# Patient Record
Sex: MALE | Race: WHITE | NOT HISPANIC OR LATINO
[De-identification: names, ages, dates, MRNs, and addresses within clinical notes are randomized per-mention and may not be internally consistent; named-entity substitution may affect disease eponyms.]

---

## 2017-01-05 ENCOUNTER — APPOINTMENT (OUTPATIENT)
Dept: INTERNAL MEDICINE | Facility: CLINIC | Age: 82
End: 2017-01-05

## 2017-01-05 ENCOUNTER — NON-APPOINTMENT (OUTPATIENT)
Age: 82
End: 2017-01-05

## 2017-01-05 VITALS
DIASTOLIC BLOOD PRESSURE: 66 MMHG | WEIGHT: 206 LBS | BODY MASS INDEX: 33.11 KG/M2 | SYSTOLIC BLOOD PRESSURE: 126 MMHG | HEIGHT: 66 IN | TEMPERATURE: 97.7 F

## 2017-01-05 DIAGNOSIS — R31.0 GROSS HEMATURIA: ICD-10-CM

## 2017-01-09 ENCOUNTER — RX RENEWAL (OUTPATIENT)
Age: 82
End: 2017-01-09

## 2017-01-09 DIAGNOSIS — D52.9 FOLATE DEFICIENCY ANEMIA, UNSPECIFIED: ICD-10-CM

## 2017-01-20 ENCOUNTER — MESSAGE (OUTPATIENT)
Age: 82
End: 2017-01-20

## 2017-01-25 ENCOUNTER — RX RENEWAL (OUTPATIENT)
Age: 82
End: 2017-01-25

## 2017-02-14 ENCOUNTER — MESSAGE (OUTPATIENT)
Age: 82
End: 2017-02-14

## 2017-02-21 ENCOUNTER — MEDICATION RENEWAL (OUTPATIENT)
Age: 82
End: 2017-02-21

## 2017-02-22 ENCOUNTER — APPOINTMENT (OUTPATIENT)
Dept: INTERNAL MEDICINE | Facility: CLINIC | Age: 82
End: 2017-02-22

## 2017-02-22 ENCOUNTER — RESULT CHARGE (OUTPATIENT)
Age: 82
End: 2017-02-22

## 2017-02-22 VITALS
WEIGHT: 203 LBS | DIASTOLIC BLOOD PRESSURE: 60 MMHG | BODY MASS INDEX: 32.77 KG/M2 | SYSTOLIC BLOOD PRESSURE: 140 MMHG | TEMPERATURE: 98.3 F

## 2017-02-23 ENCOUNTER — LABORATORY RESULT (OUTPATIENT)
Age: 82
End: 2017-02-23

## 2017-02-23 VITALS — DIASTOLIC BLOOD PRESSURE: 56 MMHG | SYSTOLIC BLOOD PRESSURE: 112 MMHG

## 2017-02-24 ENCOUNTER — MESSAGE (OUTPATIENT)
Age: 82
End: 2017-02-24

## 2017-03-05 LAB
ALBUMIN SERPL ELPH-MCNC: 4.1 G/DL
ALP BLD-CCNC: 42 U/L
ALT SERPL-CCNC: 22 U/L
ANION GAP SERPL CALC-SCNC: 14 MMOL/L
APPEARANCE: ABNORMAL
AST SERPL-CCNC: 21 U/L
BASOPHILS # BLD AUTO: 0.08 K/UL
BASOPHILS NFR BLD AUTO: 1.4 %
BILIRUB SERPL-MCNC: 0.2 MG/DL
BILIRUB UR QL STRIP: ABNORMAL
BILIRUBIN URINE: NEGATIVE
BLOOD URINE: ABNORMAL
BUN SERPL-MCNC: 23 MG/DL
CALCIUM SERPL-MCNC: 9.5 MG/DL
CHLORIDE SERPL-SCNC: 102 MMOL/L
CO2 SERPL-SCNC: 25 MMOL/L
COLOR: ABNORMAL
CREAT SERPL-MCNC: 1.25 MG/DL
EOSINOPHIL # BLD AUTO: 0.14 K/UL
EOSINOPHIL NFR BLD AUTO: 2.5 %
GLUCOSE QUALITATIVE U: NORMAL MG/DL
GLUCOSE SERPL-MCNC: 87 MG/DL
GLUCOSE UR-MCNC: NEGATIVE
HCG UR QL: 0.2 EU/DL
HCT VFR BLD CALC: 33.8 %
HGB BLD-MCNC: 10.8 G/DL
HGB UR QL STRIP.AUTO: ABNORMAL
IMM GRANULOCYTES NFR BLD AUTO: 0.4 %
KETONES UR-MCNC: ABNORMAL
KETONES URINE: ABNORMAL
LEUKOCYTE ESTERASE UR QL STRIP: ABNORMAL
LEUKOCYTE ESTERASE URINE: ABNORMAL
LYMPHOCYTES # BLD AUTO: 1.34 K/UL
LYMPHOCYTES NFR BLD AUTO: 23.9 %
MAN DIFF?: NORMAL
MCHC RBC-ENTMCNC: 26.6 PG
MCHC RBC-ENTMCNC: 32 GM/DL
MCV RBC AUTO: 83.3 FL
MONOCYTES # BLD AUTO: 0.41 K/UL
MONOCYTES NFR BLD AUTO: 7.3 %
NEUTROPHILS # BLD AUTO: 3.62 K/UL
NEUTROPHILS NFR BLD AUTO: 64.5 %
NITRITE UR QL STRIP: NEGATIVE
NITRITE URINE: NEGATIVE
PH UR STRIP: 5.5
PH URINE: 5.5
PLATELET # BLD AUTO: 275 K/UL
POTASSIUM SERPL-SCNC: 4.1 MMOL/L
PROT SERPL-MCNC: 6.7 G/DL
PROT UR STRIP-MCNC: ABNORMAL
PROTEIN URINE: 300 MG/DL
RBC # BLD: 4.06 M/UL
RBC # FLD: 15.5 %
SODIUM SERPL-SCNC: 141 MMOL/L
SP GR UR STRIP: 1.03
SPECIFIC GRAVITY URINE: 1.03
UROBILINOGEN URINE: NORMAL MG/DL
WBC # FLD AUTO: 5.61 K/UL

## 2017-03-06 ENCOUNTER — MESSAGE (OUTPATIENT)
Age: 82
End: 2017-03-06

## 2017-03-13 ENCOUNTER — MESSAGE (OUTPATIENT)
Age: 82
End: 2017-03-13

## 2017-03-17 ENCOUNTER — APPOINTMENT (OUTPATIENT)
Dept: INTERNAL MEDICINE | Facility: CLINIC | Age: 82
End: 2017-03-17

## 2017-03-17 VITALS — SYSTOLIC BLOOD PRESSURE: 122 MMHG | DIASTOLIC BLOOD PRESSURE: 72 MMHG

## 2017-03-17 VITALS
TEMPERATURE: 97.8 F | BODY MASS INDEX: 33.57 KG/M2 | SYSTOLIC BLOOD PRESSURE: 140 MMHG | WEIGHT: 208 LBS | DIASTOLIC BLOOD PRESSURE: 60 MMHG

## 2017-03-17 DIAGNOSIS — R31.9 HEMATURIA, UNSPECIFIED: ICD-10-CM

## 2017-03-17 LAB
INR PPP: 1 RATIO
POCT-PROTHROMBIN TIME: 11.5 SECS
QUALITY CONTROL: YES

## 2017-03-19 LAB
BASOPHILS # BLD AUTO: 0.05 K/UL
BASOPHILS NFR BLD AUTO: 0.8 %
EOSINOPHIL # BLD AUTO: 0.13 K/UL
EOSINOPHIL NFR BLD AUTO: 2.2 %
HCT VFR BLD CALC: 35.4 %
HGB BLD-MCNC: 11.2 G/DL
IMM GRANULOCYTES NFR BLD AUTO: 0.5 %
LYMPHOCYTES # BLD AUTO: 1.56 K/UL
LYMPHOCYTES NFR BLD AUTO: 25.8 %
MAN DIFF?: NORMAL
MCHC RBC-ENTMCNC: 25.6 PG
MCHC RBC-ENTMCNC: 31.6 GM/DL
MCV RBC AUTO: 80.8 FL
MONOCYTES # BLD AUTO: 0.58 K/UL
MONOCYTES NFR BLD AUTO: 9.6 %
NEUTROPHILS # BLD AUTO: 3.69 K/UL
NEUTROPHILS NFR BLD AUTO: 61.1 %
PLATELET # BLD AUTO: 252 K/UL
RBC # BLD: 4.38 M/UL
RBC # FLD: 15.6 %
WBC # FLD AUTO: 6.04 K/UL

## 2017-03-24 ENCOUNTER — APPOINTMENT (OUTPATIENT)
Dept: INTERNAL MEDICINE | Facility: CLINIC | Age: 82
End: 2017-03-24

## 2017-03-24 VITALS
DIASTOLIC BLOOD PRESSURE: 66 MMHG | WEIGHT: 208 LBS | BODY MASS INDEX: 33.43 KG/M2 | HEIGHT: 66 IN | SYSTOLIC BLOOD PRESSURE: 120 MMHG

## 2017-04-12 ENCOUNTER — RX RENEWAL (OUTPATIENT)
Age: 82
End: 2017-04-12

## 2017-04-21 ENCOUNTER — RX RENEWAL (OUTPATIENT)
Age: 82
End: 2017-04-21

## 2017-04-24 ENCOUNTER — RX RENEWAL (OUTPATIENT)
Age: 82
End: 2017-04-24

## 2017-05-26 ENCOUNTER — MEDICATION RENEWAL (OUTPATIENT)
Age: 82
End: 2017-05-26

## 2017-06-23 ENCOUNTER — MEDICATION RENEWAL (OUTPATIENT)
Age: 82
End: 2017-06-23

## 2017-06-23 ENCOUNTER — APPOINTMENT (OUTPATIENT)
Dept: INTERNAL MEDICINE | Facility: CLINIC | Age: 82
End: 2017-06-23

## 2017-06-23 ENCOUNTER — LABORATORY RESULT (OUTPATIENT)
Age: 82
End: 2017-06-23

## 2017-06-23 VITALS
BODY MASS INDEX: 35.36 KG/M2 | HEIGHT: 66 IN | SYSTOLIC BLOOD PRESSURE: 124 MMHG | WEIGHT: 220 LBS | DIASTOLIC BLOOD PRESSURE: 78 MMHG

## 2017-06-23 DIAGNOSIS — Z00.00 ENCOUNTER FOR GENERAL ADULT MEDICAL EXAMINATION W/OUT ABNORMAL FINDINGS: ICD-10-CM

## 2017-06-23 LAB
ALBUMIN SERPL ELPH-MCNC: 4.1 G/DL
ALP BLD-CCNC: 50 U/L
ALT SERPL-CCNC: 33 U/L
ANION GAP SERPL CALC-SCNC: 22 MMOL/L
AST SERPL-CCNC: 27 U/L
BASOPHILS # BLD AUTO: 0.06 K/UL
BASOPHILS NFR BLD AUTO: 0.9 %
BILIRUB SERPL-MCNC: <0.2 MG/DL
BUN SERPL-MCNC: 26 MG/DL
CALCIUM SERPL-MCNC: 10.6 MG/DL
CHLORIDE SERPL-SCNC: 99 MMOL/L
CHOLEST SERPL-MCNC: 158 MG/DL
CHOLEST/HDLC SERPL: 4.2 RATIO
CO2 SERPL-SCNC: 19 MMOL/L
CREAT SERPL-MCNC: 1.4 MG/DL
EOSINOPHIL # BLD AUTO: 0.2 K/UL
EOSINOPHIL NFR BLD AUTO: 3 %
GLUCOSE SERPL-MCNC: 245 MG/DL
HBA1C MFR BLD HPLC: 7.8 %
HCT VFR BLD CALC: 39.1 %
HDLC SERPL-MCNC: 38 MG/DL
HGB BLD-MCNC: 12.8 G/DL
IMM GRANULOCYTES NFR BLD AUTO: 0.3 %
LDLC SERPL CALC-MCNC: 53 MG/DL
LYMPHOCYTES # BLD AUTO: 1.62 K/UL
LYMPHOCYTES NFR BLD AUTO: 24.6 %
MAN DIFF?: NORMAL
MCHC RBC-ENTMCNC: 26.5 PG
MCHC RBC-ENTMCNC: 32.7 GM/DL
MCV RBC AUTO: 81 FL
MONOCYTES # BLD AUTO: 0.48 K/UL
MONOCYTES NFR BLD AUTO: 7.3 %
NEUTROPHILS # BLD AUTO: 4.2 K/UL
NEUTROPHILS NFR BLD AUTO: 63.9 %
PLATELET # BLD AUTO: 260 K/UL
POTASSIUM SERPL-SCNC: 4.3 MMOL/L
PROT SERPL-MCNC: 7.6 G/DL
RBC # BLD: 4.83 M/UL
RBC # FLD: 17.5 %
SODIUM SERPL-SCNC: 140 MMOL/L
T3 SERPL-MCNC: 112 NG/DL
T4 SERPL-MCNC: 7.3 UG/DL
TRIGL SERPL-MCNC: 335 MG/DL
TSH SERPL-ACNC: 4.41 UIU/ML
WBC # FLD AUTO: 6.58 K/UL

## 2017-07-09 ENCOUNTER — RX RENEWAL (OUTPATIENT)
Age: 82
End: 2017-07-09

## 2017-07-13 ENCOUNTER — MEDICATION RENEWAL (OUTPATIENT)
Age: 82
End: 2017-07-13

## 2017-07-27 ENCOUNTER — APPOINTMENT (OUTPATIENT)
Dept: INTERNAL MEDICINE | Facility: CLINIC | Age: 82
End: 2017-07-27
Payer: MEDICARE

## 2017-07-27 VITALS
SYSTOLIC BLOOD PRESSURE: 130 MMHG | BODY MASS INDEX: 33.59 KG/M2 | WEIGHT: 209 LBS | HEIGHT: 66 IN | TEMPERATURE: 97.8 F | DIASTOLIC BLOOD PRESSURE: 70 MMHG

## 2017-07-27 PROCEDURE — 99214 OFFICE O/P EST MOD 30 MIN: CPT

## 2017-07-28 ENCOUNTER — RX RENEWAL (OUTPATIENT)
Age: 82
End: 2017-07-28

## 2017-07-28 ENCOUNTER — LABORATORY RESULT (OUTPATIENT)
Age: 82
End: 2017-07-28

## 2017-08-02 ENCOUNTER — APPOINTMENT (OUTPATIENT)
Dept: GASTROENTEROLOGY | Facility: CLINIC | Age: 82
End: 2017-08-02

## 2017-08-03 ENCOUNTER — MESSAGE (OUTPATIENT)
Age: 82
End: 2017-08-03

## 2017-08-03 ENCOUNTER — MEDICATION RENEWAL (OUTPATIENT)
Age: 82
End: 2017-08-03

## 2017-08-03 LAB
BACTERIA STL CULT: NORMAL
C DIFF TOX GENS STL QL NAA+PROBE: NORMAL
CDIFF BY PCR: NOT DETECTED
CRYPTOSP AG SPEC QL: NORMAL
G LAMBLIA AG STL QL: NORMAL

## 2017-08-04 LAB — DEPRECATED O AND P PREP STL: NORMAL

## 2017-09-06 ENCOUNTER — APPOINTMENT (OUTPATIENT)
Dept: INTERNAL MEDICINE | Facility: CLINIC | Age: 82
End: 2017-09-06
Payer: MEDICARE

## 2017-09-06 ENCOUNTER — RESULT CHARGE (OUTPATIENT)
Age: 82
End: 2017-09-06

## 2017-09-06 DIAGNOSIS — R94.6 ABNORMAL RESULTS OF THYROID FUNCTION STUDIES: ICD-10-CM

## 2017-09-06 PROCEDURE — 90686 IIV4 VACC NO PRSV 0.5 ML IM: CPT

## 2017-09-06 PROCEDURE — G0008: CPT

## 2017-09-06 PROCEDURE — 99214 OFFICE O/P EST MOD 30 MIN: CPT | Mod: 25

## 2017-09-06 PROCEDURE — 93040 RHYTHM ECG WITH REPORT: CPT | Mod: 59

## 2017-09-06 PROCEDURE — 93000 ELECTROCARDIOGRAM COMPLETE: CPT

## 2017-09-06 PROCEDURE — 36415 COLL VENOUS BLD VENIPUNCTURE: CPT

## 2017-09-06 RX ORDER — COLCHICINE 0.6 MG/1
0.6 TABLET, FILM COATED ORAL DAILY
Qty: 90 | Refills: 3 | Status: DISCONTINUED | COMMUNITY
Start: 2017-06-23 | End: 2017-09-06

## 2017-09-07 ENCOUNTER — LABORATORY RESULT (OUTPATIENT)
Age: 82
End: 2017-09-07

## 2017-09-07 VITALS — WEIGHT: 218 LBS | BODY MASS INDEX: 35.19 KG/M2

## 2017-09-07 PROBLEM — R94.6 ELEVATED TSH: Status: ACTIVE | Noted: 2017-09-07

## 2017-09-12 ENCOUNTER — APPOINTMENT (OUTPATIENT)
Dept: INTERNAL MEDICINE | Facility: CLINIC | Age: 82
End: 2017-09-12
Payer: MEDICARE

## 2017-09-12 PROCEDURE — 93306 TTE W/DOPPLER COMPLETE: CPT

## 2017-10-15 LAB
ALBUMIN SERPL ELPH-MCNC: 4 G/DL
ALP BLD-CCNC: 38 U/L
ALT SERPL-CCNC: 33 U/L
ANION GAP SERPL CALC-SCNC: 18 MMOL/L
AST SERPL-CCNC: 32 U/L
BASOPHILS # BLD AUTO: 0.03 K/UL
BASOPHILS NFR BLD AUTO: 0.9 %
BILIRUB SERPL-MCNC: 0.3 MG/DL
BUN SERPL-MCNC: 26 MG/DL
CALCIUM SERPL-MCNC: 10 MG/DL
CHLORIDE SERPL-SCNC: 99 MMOL/L
CHOLEST SERPL-MCNC: 144 MG/DL
CHOLEST/HDLC SERPL: 3.8 RATIO
CO2 SERPL-SCNC: 23 MMOL/L
CREAT SERPL-MCNC: 1.29 MG/DL
EOSINOPHIL # BLD AUTO: 0.12 K/UL
EOSINOPHIL NFR BLD AUTO: 3.6 %
GLUCOSE SERPL-MCNC: 129 MG/DL
HCT VFR BLD CALC: 39.4 %
HDLC SERPL-MCNC: 38 MG/DL
HGB BLD-MCNC: 12.9 G/DL
IMM GRANULOCYTES NFR BLD AUTO: 1.2 %
LDLC SERPL CALC-MCNC: NORMAL
LDLC SERPL DIRECT ASSAY-MCNC: 56 MG/DL
LYMPHOCYTES # BLD AUTO: 1.1 K/UL
LYMPHOCYTES NFR BLD AUTO: 32.7 %
MAN DIFF?: NORMAL
MCHC RBC-ENTMCNC: 28.3 PG
MCHC RBC-ENTMCNC: 32.7 GM/DL
MCV RBC AUTO: 86.4 FL
MONOCYTES # BLD AUTO: 0.27 K/UL
MONOCYTES NFR BLD AUTO: 8 %
NEUTROPHILS # BLD AUTO: 1.8 K/UL
NEUTROPHILS NFR BLD AUTO: 53.6 %
NT-PROBNP SERPL-MCNC: 114 PG/ML
PLATELET # BLD AUTO: 227 K/UL
POTASSIUM SERPL-SCNC: 3.9 MMOL/L
PROT SERPL-MCNC: 7.4 G/DL
RBC # BLD: 4.56 M/UL
RBC # FLD: 16.3 %
SODIUM SERPL-SCNC: 140 MMOL/L
T3 SERPL-MCNC: 115 NG/DL
T3RU NFR SERPL: 1.08 INDEX
T4 FREE SERPL-MCNC: 1 NG/DL
T4 SERPL-MCNC: 6.7 UG/DL
TRIGL SERPL-MCNC: 536 MG/DL
TSH SERPL-ACNC: 5.26 UIU/ML
WBC # FLD AUTO: 3.36 K/UL

## 2017-10-20 ENCOUNTER — APPOINTMENT (OUTPATIENT)
Dept: INTERNAL MEDICINE | Facility: CLINIC | Age: 82
End: 2017-10-20
Payer: MEDICARE

## 2017-10-20 VITALS
SYSTOLIC BLOOD PRESSURE: 124 MMHG | HEIGHT: 66 IN | DIASTOLIC BLOOD PRESSURE: 88 MMHG | WEIGHT: 211 LBS | BODY MASS INDEX: 33.91 KG/M2

## 2017-10-20 PROCEDURE — 99215 OFFICE O/P EST HI 40 MIN: CPT | Mod: 25

## 2017-10-20 PROCEDURE — 94010 BREATHING CAPACITY TEST: CPT

## 2017-10-24 ENCOUNTER — RX RENEWAL (OUTPATIENT)
Age: 82
End: 2017-10-24

## 2017-10-25 ENCOUNTER — RX RENEWAL (OUTPATIENT)
Age: 82
End: 2017-10-25

## 2017-10-27 ENCOUNTER — RX RENEWAL (OUTPATIENT)
Age: 82
End: 2017-10-27

## 2017-11-09 ENCOUNTER — RX RENEWAL (OUTPATIENT)
Age: 82
End: 2017-11-09

## 2017-11-27 ENCOUNTER — EMERGENCY (EMERGENCY)
Facility: HOSPITAL | Age: 82
LOS: 1 days | Discharge: HOME | End: 2017-11-27
Attending: EMERGENCY MEDICINE | Admitting: EMERGENCY MEDICINE
Payer: MEDICARE

## 2017-11-27 VITALS
HEART RATE: 78 BPM | TEMPERATURE: 99 F | SYSTOLIC BLOOD PRESSURE: 185 MMHG | DIASTOLIC BLOOD PRESSURE: 70 MMHG | RESPIRATION RATE: 16 BRPM | OXYGEN SATURATION: 96 %

## 2017-11-27 VITALS
DIASTOLIC BLOOD PRESSURE: 85 MMHG | OXYGEN SATURATION: 94 % | TEMPERATURE: 98 F | HEART RATE: 70 BPM | SYSTOLIC BLOOD PRESSURE: 169 MMHG | RESPIRATION RATE: 16 BRPM

## 2017-11-27 DIAGNOSIS — C61 MALIGNANT NEOPLASM OF PROSTATE: Chronic | ICD-10-CM

## 2017-11-27 LAB
ALBUMIN SERPL ELPH-MCNC: 3.9 G/DL — SIGNIFICANT CHANGE UP (ref 3.3–5)
ALP SERPL-CCNC: 45 U/L — SIGNIFICANT CHANGE UP (ref 40–120)
ALT FLD-CCNC: 25 U/L RC — SIGNIFICANT CHANGE UP (ref 10–45)
ANION GAP SERPL CALC-SCNC: 13 MMOL/L — SIGNIFICANT CHANGE UP (ref 5–17)
APPEARANCE UR: CLEAR — SIGNIFICANT CHANGE UP
APTT BLD: 29.5 SEC — SIGNIFICANT CHANGE UP (ref 27.5–37.4)
AST SERPL-CCNC: 22 U/L — SIGNIFICANT CHANGE UP (ref 10–40)
BASOPHILS # BLD AUTO: 0.1 K/UL — SIGNIFICANT CHANGE UP (ref 0–0.2)
BASOPHILS NFR BLD AUTO: 0.7 % — SIGNIFICANT CHANGE UP (ref 0–2)
BILIRUB SERPL-MCNC: 0.4 MG/DL — SIGNIFICANT CHANGE UP (ref 0.2–1.2)
BILIRUB UR-MCNC: NEGATIVE — SIGNIFICANT CHANGE UP
BUN SERPL-MCNC: 22 MG/DL — SIGNIFICANT CHANGE UP (ref 7–23)
CALCIUM SERPL-MCNC: 10 MG/DL — SIGNIFICANT CHANGE UP (ref 8.4–10.5)
CHLORIDE SERPL-SCNC: 98 MMOL/L — SIGNIFICANT CHANGE UP (ref 96–108)
CO2 SERPL-SCNC: 29 MMOL/L — SIGNIFICANT CHANGE UP (ref 22–31)
COLOR SPEC: YELLOW — SIGNIFICANT CHANGE UP
CREAT SERPL-MCNC: 1.18 MG/DL — SIGNIFICANT CHANGE UP (ref 0.5–1.3)
DIFF PNL FLD: ABNORMAL
EOSINOPHIL # BLD AUTO: 0.2 K/UL — SIGNIFICANT CHANGE UP (ref 0–0.5)
EOSINOPHIL NFR BLD AUTO: 2.5 % — SIGNIFICANT CHANGE UP (ref 0–6)
GLUCOSE SERPL-MCNC: 223 MG/DL — HIGH (ref 70–99)
GLUCOSE UR QL: 250
HCT VFR BLD CALC: 40.4 % — SIGNIFICANT CHANGE UP (ref 39–50)
HGB BLD-MCNC: 14.1 G/DL — SIGNIFICANT CHANGE UP (ref 13–17)
INR BLD: 1.14 RATIO — SIGNIFICANT CHANGE UP (ref 0.88–1.16)
KETONES UR-MCNC: NEGATIVE — SIGNIFICANT CHANGE UP
LEUKOCYTE ESTERASE UR-ACNC: NEGATIVE — SIGNIFICANT CHANGE UP
LYMPHOCYTES # BLD AUTO: 1.7 K/UL — SIGNIFICANT CHANGE UP (ref 1–3.3)
LYMPHOCYTES # BLD AUTO: 21.9 % — SIGNIFICANT CHANGE UP (ref 13–44)
MCHC RBC-ENTMCNC: 31.3 PG — SIGNIFICANT CHANGE UP (ref 27–34)
MCHC RBC-ENTMCNC: 34.8 GM/DL — SIGNIFICANT CHANGE UP (ref 32–36)
MCV RBC AUTO: 89.9 FL — SIGNIFICANT CHANGE UP (ref 80–100)
MONOCYTES # BLD AUTO: 0.7 K/UL — SIGNIFICANT CHANGE UP (ref 0–0.9)
MONOCYTES NFR BLD AUTO: 9.1 % — SIGNIFICANT CHANGE UP (ref 2–14)
NEUTROPHILS # BLD AUTO: 5.2 K/UL — SIGNIFICANT CHANGE UP (ref 1.8–7.4)
NEUTROPHILS NFR BLD AUTO: 65.8 % — SIGNIFICANT CHANGE UP (ref 43–77)
NITRITE UR-MCNC: NEGATIVE — SIGNIFICANT CHANGE UP
PH UR: 6.5 — SIGNIFICANT CHANGE UP (ref 5–8)
PLATELET # BLD AUTO: 254 K/UL — SIGNIFICANT CHANGE UP (ref 150–400)
POTASSIUM SERPL-MCNC: 3.7 MMOL/L — SIGNIFICANT CHANGE UP (ref 3.5–5.3)
POTASSIUM SERPL-SCNC: 3.7 MMOL/L — SIGNIFICANT CHANGE UP (ref 3.5–5.3)
PROT SERPL-MCNC: 7.3 G/DL — SIGNIFICANT CHANGE UP (ref 6–8.3)
PROT UR-MCNC: 500 MG/DL
PROTHROM AB SERPL-ACNC: 12.4 SEC — SIGNIFICANT CHANGE UP (ref 9.8–12.7)
RBC # BLD: 4.5 M/UL — SIGNIFICANT CHANGE UP (ref 4.2–5.8)
RBC # FLD: 13.7 % — SIGNIFICANT CHANGE UP (ref 10.3–14.5)
RBC CASTS # UR COMP ASSIST: SIGNIFICANT CHANGE UP /HPF (ref 0–2)
SODIUM SERPL-SCNC: 140 MMOL/L — SIGNIFICANT CHANGE UP (ref 135–145)
SP GR SPEC: 1.02 — SIGNIFICANT CHANGE UP (ref 1.01–1.02)
UROBILINOGEN FLD QL: NEGATIVE — SIGNIFICANT CHANGE UP
WBC # BLD: 7.8 K/UL — SIGNIFICANT CHANGE UP (ref 3.8–10.5)
WBC # FLD AUTO: 7.8 K/UL — SIGNIFICANT CHANGE UP (ref 3.8–10.5)
WBC UR QL: SIGNIFICANT CHANGE UP /HPF (ref 0–5)

## 2017-11-27 PROCEDURE — 99285 EMERGENCY DEPT VISIT HI MDM: CPT

## 2017-11-27 PROCEDURE — 71260 CT THORAX DX C+: CPT | Mod: 26

## 2017-11-27 PROCEDURE — 74177 CT ABD & PELVIS W/CONTRAST: CPT | Mod: 26

## 2017-11-27 RX ORDER — LIDOCAINE 4 G/100G
1 CREAM TOPICAL ONCE
Qty: 0 | Refills: 0 | Status: COMPLETED | OUTPATIENT
Start: 2017-11-27 | End: 2017-11-27

## 2017-11-27 RX ORDER — MORPHINE SULFATE 50 MG/1
4 CAPSULE, EXTENDED RELEASE ORAL ONCE
Qty: 0 | Refills: 0 | Status: DISCONTINUED | OUTPATIENT
Start: 2017-11-27 | End: 2017-11-27

## 2017-11-27 RX ORDER — ACETAMINOPHEN 500 MG
1000 TABLET ORAL ONCE
Qty: 0 | Refills: 0 | Status: COMPLETED | OUTPATIENT
Start: 2017-11-27 | End: 2017-11-27

## 2017-11-27 RX ORDER — TETANUS TOXOID, REDUCED DIPHTHERIA TOXOID AND ACELLULAR PERTUSSIS VACCINE, ADSORBED 5; 2.5; 8; 8; 2.5 [IU]/.5ML; [IU]/.5ML; UG/.5ML; UG/.5ML; UG/.5ML
0.5 SUSPENSION INTRAMUSCULAR ONCE
Qty: 0 | Refills: 0 | Status: COMPLETED | OUTPATIENT
Start: 2017-11-27 | End: 2017-11-27

## 2017-11-27 RX ADMIN — Medication 400 MILLIGRAM(S): at 18:00

## 2017-11-27 RX ADMIN — LIDOCAINE 1 PATCH: 4 CREAM TOPICAL at 20:48

## 2017-11-27 RX ADMIN — Medication 1000 MILLIGRAM(S): at 20:49

## 2017-11-27 RX ADMIN — MORPHINE SULFATE 4 MILLIGRAM(S): 50 CAPSULE, EXTENDED RELEASE ORAL at 19:28

## 2017-11-27 RX ADMIN — MORPHINE SULFATE 4 MILLIGRAM(S): 50 CAPSULE, EXTENDED RELEASE ORAL at 20:49

## 2017-11-27 NOTE — ED PROVIDER NOTE - PLAN OF CARE
1. Rest, ice, elevate area.  Take Tylenol 650mg every 4-6 hrs with food for pain. Continue all home medications as prescribed.   2. Follow up with PMD within 48-72 hrs.    3. Any worsening pain, swelling, weakness, numbness return to ED. Ortho referral list provided if needed.

## 2017-11-27 NOTE — ED PROVIDER NOTE - ATTENDING CONTRIBUTION TO CARE
Angelika Amezcua MD - Attending Physician: I have personally seen and examined this patient. I have discussed the case with the ACP. I have reviewed all pertinent clinical information, including history, physical exam, plan and the ACP’s note and agree except as noted. See MDM

## 2017-11-27 NOTE — ED PROVIDER NOTE - OBJECTIVE STATEMENT
81yom pmhx of HTN HLD CAD DM GERD hx of Afib on xarelto bib spouse s/p fall 2 nights ago with trauma to the R flank/lower rib region with the end table along with scarping of the skin. since then with worsening pain and pain with movement and inspiration. NO fever or chlls. No head trauma. No vomiting. No ams.

## 2017-11-27 NOTE — ED ADULT NURSE NOTE - PMH
Atrial flutter    CAD (coronary artery disease)    Diabetes mellitus  type 2  Diverticulosis    GERD (gastroesophageal reflux disease)    Hyperlipidemia    Hypertension    Irritable bowel syndrome    Left bundle branch block (LBBB)    Obesity    Osteoarthritis  knee  Prostate CA

## 2017-11-27 NOTE — ED ADULT TRIAGE NOTE - CHIEF COMPLAINT QUOTE
trip/fall 2 days ago, hit back on table then onto floor   denies hitting head, no loc  on xarelto for afib

## 2017-11-27 NOTE — ED PROVIDER NOTE - PROGRESS NOTE DETAILS
results of the CT scan and bloodwork discussed with pt. No acute fx. pt comfortable. ambulatory in the ER. stable vitals. Will follow up with Dr. Felipe Garcia in office. -SANGEETA Hernandez

## 2017-11-27 NOTE — ED PROVIDER NOTE - MEDICAL DECISION MAKING DETAILS
Angelika Amezcua MD - Attending Physician: Pt with mechanical fall, here with flank pain, on xarelto. Tender to palpation, superficial hematoma noted. Imaging for eval, Labs for hb

## 2017-11-27 NOTE — ED PROVIDER NOTE - CARE PLAN
Instructions for follow-up, activity and diet:	1. Rest, ice, elevate area.  Take Tylenol 650mg every 4-6 hrs with food for pain. Continue all home medications as prescribed.   2. Follow up with PMD within 48-72 hrs.    3. Any worsening pain, swelling, weakness, numbness return to ED. Ortho referral list provided if needed. Principal Discharge DX:	Flank pain  Instructions for follow-up, activity and diet:	1. Rest, ice, elevate area.  Take Tylenol 650mg every 4-6 hrs with food for pain. Continue all home medications as prescribed.   2. Follow up with PMD within 48-72 hrs.    3. Any worsening pain, swelling, weakness, numbness return to ED. Ortho referral list provided if needed.

## 2017-11-27 NOTE — ED PROVIDER NOTE - PHYSICAL EXAMINATION
large area of ecchymosis to the R flank region and tenderness over the lower rib region. no crepitus. limited exam due to pain and limited rom due to pain

## 2017-11-27 NOTE — ED ADULT NURSE NOTE - OBJECTIVE STATEMENT
pt with fall right flan pain pt fell 48hrs ago durring the night denies head injury ambulatory to er with wife denies dizziness pt with right flank abradsion bruising pt denies hematuria skin warm dry no vomiting mno visual changes deneis chest pain no further complaints accompanied to er with wife pt with labs sent and ct scans posted pt declined morphine at this time accepted tylenol iv

## 2017-11-29 RX ORDER — OXYCODONE HYDROCHLORIDE 5 MG/1
1 TABLET ORAL
Qty: 12 | Refills: 0 | OUTPATIENT
Start: 2017-11-29

## 2017-12-26 ENCOUNTER — RX RENEWAL (OUTPATIENT)
Age: 82
End: 2017-12-26

## 2017-12-28 ENCOUNTER — LABORATORY RESULT (OUTPATIENT)
Age: 82
End: 2017-12-28

## 2017-12-28 ENCOUNTER — APPOINTMENT (OUTPATIENT)
Dept: INTERNAL MEDICINE | Facility: CLINIC | Age: 82
End: 2017-12-28
Payer: MEDICARE

## 2017-12-28 VITALS
DIASTOLIC BLOOD PRESSURE: 72 MMHG | WEIGHT: 207 LBS | SYSTOLIC BLOOD PRESSURE: 120 MMHG | HEIGHT: 65 IN | BODY MASS INDEX: 34.49 KG/M2

## 2017-12-28 DIAGNOSIS — D64.9 ANEMIA, UNSPECIFIED: ICD-10-CM

## 2017-12-28 DIAGNOSIS — R26.89 OTHER ABNORMALITIES OF GAIT AND MOBILITY: ICD-10-CM

## 2017-12-28 PROCEDURE — 36415 COLL VENOUS BLD VENIPUNCTURE: CPT

## 2017-12-28 PROCEDURE — 99214 OFFICE O/P EST MOD 30 MIN: CPT | Mod: 25

## 2017-12-28 PROCEDURE — 94010 BREATHING CAPACITY TEST: CPT

## 2018-01-05 LAB
ALBUMIN SERPL ELPH-MCNC: 3.9 G/DL
ALP BLD-CCNC: 64 U/L
ALT SERPL-CCNC: 34 U/L
ANION GAP SERPL CALC-SCNC: 18 MMOL/L
AST SERPL-CCNC: 34 U/L
BASOPHILS # BLD AUTO: 0.05 K/UL
BASOPHILS NFR BLD AUTO: 0.7 %
BILIRUB SERPL-MCNC: 0.2 MG/DL
BUN SERPL-MCNC: 23 MG/DL
CALCIUM SERPL-MCNC: 10.4 MG/DL
CHLORIDE SERPL-SCNC: 99 MMOL/L
CHOLEST SERPL-MCNC: 131 MG/DL
CHOLEST/HDLC SERPL: 3.4 RATIO
CO2 SERPL-SCNC: 24 MMOL/L
CREAT SERPL-MCNC: 1.34 MG/DL
EOSINOPHIL # BLD AUTO: 0.22 K/UL
EOSINOPHIL NFR BLD AUTO: 3.2 %
GLUCOSE SERPL-MCNC: 195 MG/DL
HBA1C MFR BLD HPLC: 6.4 %
HCT VFR BLD CALC: 41.2 %
HDLC SERPL-MCNC: 39 MG/DL
HGB BLD-MCNC: 13.8 G/DL
IMM GRANULOCYTES NFR BLD AUTO: 0.3 %
LDLC SERPL CALC-MCNC: 34 MG/DL
LYMPHOCYTES # BLD AUTO: 1.39 K/UL
LYMPHOCYTES NFR BLD AUTO: 20.1 %
MAN DIFF?: NORMAL
MCHC RBC-ENTMCNC: 29.9 PG
MCHC RBC-ENTMCNC: 33.5 GM/DL
MCV RBC AUTO: 89.4 FL
MONOCYTES # BLD AUTO: 0.5 K/UL
MONOCYTES NFR BLD AUTO: 7.2 %
NEUTROPHILS # BLD AUTO: 4.75 K/UL
NEUTROPHILS NFR BLD AUTO: 68.5 %
PLATELET # BLD AUTO: 235 K/UL
POTASSIUM SERPL-SCNC: 4.3 MMOL/L
PROT SERPL-MCNC: 7.4 G/DL
RBC # BLD: 4.61 M/UL
RBC # FLD: 15.1 %
SODIUM SERPL-SCNC: 141 MMOL/L
T3 SERPL-MCNC: 130 NG/DL
T4 SERPL-MCNC: 7.1 UG/DL
TRIGL SERPL-MCNC: 291 MG/DL
TSH SERPL-ACNC: 5.13 UIU/ML
WBC # FLD AUTO: 6.93 K/UL

## 2018-01-08 ENCOUNTER — RX RENEWAL (OUTPATIENT)
Age: 83
End: 2018-01-08

## 2018-01-22 ENCOUNTER — RX RENEWAL (OUTPATIENT)
Age: 83
End: 2018-01-22

## 2018-01-23 ENCOUNTER — RX RENEWAL (OUTPATIENT)
Age: 83
End: 2018-01-23

## 2018-01-30 ENCOUNTER — APPOINTMENT (OUTPATIENT)
Dept: ORTHOPEDIC SURGERY | Facility: CLINIC | Age: 83
End: 2018-01-30
Payer: MEDICARE

## 2018-01-30 VITALS
WEIGHT: 206 LBS | SYSTOLIC BLOOD PRESSURE: 151 MMHG | BODY MASS INDEX: 33.11 KG/M2 | HEIGHT: 66 IN | DIASTOLIC BLOOD PRESSURE: 69 MMHG | HEART RATE: 80 BPM

## 2018-01-30 DIAGNOSIS — M25.562 PAIN IN LEFT KNEE: ICD-10-CM

## 2018-01-30 PROCEDURE — 99204 OFFICE O/P NEW MOD 45 MIN: CPT | Mod: 25

## 2018-01-30 PROCEDURE — 73564 X-RAY EXAM KNEE 4 OR MORE: CPT | Mod: LT

## 2018-01-30 PROCEDURE — 20610 DRAIN/INJ JOINT/BURSA W/O US: CPT | Mod: LT

## 2018-02-02 ENCOUNTER — RX RENEWAL (OUTPATIENT)
Age: 83
End: 2018-02-02

## 2018-02-22 ENCOUNTER — LABORATORY RESULT (OUTPATIENT)
Age: 83
End: 2018-02-22

## 2018-02-22 ENCOUNTER — APPOINTMENT (OUTPATIENT)
Dept: INTERNAL MEDICINE | Facility: CLINIC | Age: 83
End: 2018-02-22
Payer: MEDICARE

## 2018-02-22 VITALS
BODY MASS INDEX: 32.95 KG/M2 | WEIGHT: 205 LBS | HEIGHT: 66 IN | DIASTOLIC BLOOD PRESSURE: 70 MMHG | SYSTOLIC BLOOD PRESSURE: 118 MMHG | TEMPERATURE: 96.5 F

## 2018-02-22 DIAGNOSIS — H26.9 UNSPECIFIED CATARACT: ICD-10-CM

## 2018-02-22 DIAGNOSIS — F32.9 MAJOR DEPRESSIVE DISORDER, SINGLE EPISODE, UNSPECIFIED: ICD-10-CM

## 2018-02-22 DIAGNOSIS — Z01.818 ENCOUNTER FOR OTHER PREPROCEDURAL EXAMINATION: ICD-10-CM

## 2018-02-22 PROCEDURE — 99214 OFFICE O/P EST MOD 30 MIN: CPT | Mod: 25

## 2018-02-22 PROCEDURE — 36415 COLL VENOUS BLD VENIPUNCTURE: CPT

## 2018-02-22 PROCEDURE — 93000 ELECTROCARDIOGRAM COMPLETE: CPT

## 2018-02-26 ENCOUNTER — APPOINTMENT (OUTPATIENT)
Dept: CARDIOLOGY | Facility: CLINIC | Age: 83
End: 2018-02-26
Payer: MEDICARE

## 2018-02-26 ENCOUNTER — NON-APPOINTMENT (OUTPATIENT)
Age: 83
End: 2018-02-26

## 2018-02-26 VITALS — WEIGHT: 205 LBS | BODY MASS INDEX: 33.09 KG/M2 | OXYGEN SATURATION: 95 % | HEART RATE: 63 BPM

## 2018-02-26 VITALS — SYSTOLIC BLOOD PRESSURE: 140 MMHG | DIASTOLIC BLOOD PRESSURE: 60 MMHG

## 2018-02-26 DIAGNOSIS — R10.819 ABDOMINAL TENDERNESS, UNSPECIFIED SITE: ICD-10-CM

## 2018-02-26 DIAGNOSIS — I51.7 CARDIOMEGALY: ICD-10-CM

## 2018-02-26 DIAGNOSIS — I44.7 LEFT BUNDLE-BRANCH BLOCK, UNSPECIFIED: ICD-10-CM

## 2018-02-26 PROCEDURE — 93000 ELECTROCARDIOGRAM COMPLETE: CPT

## 2018-02-26 PROCEDURE — 93040 RHYTHM ECG WITH REPORT: CPT | Mod: 59

## 2018-02-26 PROCEDURE — 99215 OFFICE O/P EST HI 40 MIN: CPT

## 2018-02-27 ENCOUNTER — NON-APPOINTMENT (OUTPATIENT)
Age: 83
End: 2018-02-27

## 2018-02-27 LAB
ALBUMIN SERPL ELPH-MCNC: 3.9 G/DL
ALP BLD-CCNC: 45 U/L
ALT SERPL-CCNC: 29 U/L
ANION GAP SERPL CALC-SCNC: 18 MMOL/L
AST SERPL-CCNC: 28 U/L
BASOPHILS # BLD AUTO: 0.04 K/UL
BASOPHILS NFR BLD AUTO: 0.5 %
BILIRUB SERPL-MCNC: 0.4 MG/DL
BUN SERPL-MCNC: 24 MG/DL
CALCIUM SERPL-MCNC: 9.8 MG/DL
CHLORIDE SERPL-SCNC: 97 MMOL/L
CO2 SERPL-SCNC: 22 MMOL/L
CREAT SERPL-MCNC: 1.27 MG/DL
EOSINOPHIL # BLD AUTO: 0.21 K/UL
EOSINOPHIL NFR BLD AUTO: 2.8 %
GLUCOSE SERPL-MCNC: 164 MG/DL
HCT VFR BLD CALC: 40.2 %
HGB BLD-MCNC: 13.6 G/DL
IMM GRANULOCYTES NFR BLD AUTO: 0.3 %
LYMPHOCYTES # BLD AUTO: 1.62 K/UL
LYMPHOCYTES NFR BLD AUTO: 21.8 %
MAN DIFF?: NORMAL
MCHC RBC-ENTMCNC: 30.1 PG
MCHC RBC-ENTMCNC: 33.8 GM/DL
MCV RBC AUTO: 88.9 FL
MONOCYTES # BLD AUTO: 0.59 K/UL
MONOCYTES NFR BLD AUTO: 7.9 %
NEUTROPHILS # BLD AUTO: 4.96 K/UL
NEUTROPHILS NFR BLD AUTO: 66.7 %
PLATELET # BLD AUTO: 223 K/UL
POTASSIUM SERPL-SCNC: 4 MMOL/L
PROT SERPL-MCNC: 7 G/DL
RBC # BLD: 4.52 M/UL
RBC # FLD: 15 %
SODIUM SERPL-SCNC: 137 MMOL/L
WBC # FLD AUTO: 7.44 K/UL

## 2018-02-28 ENCOUNTER — EMERGENCY (EMERGENCY)
Facility: HOSPITAL | Age: 83
LOS: 1 days | Discharge: HOME | End: 2018-02-28
Attending: EMERGENCY MEDICINE | Admitting: EMERGENCY MEDICINE
Payer: MEDICARE

## 2018-02-28 ENCOUNTER — APPOINTMENT (OUTPATIENT)
Dept: INTERNAL MEDICINE | Facility: CLINIC | Age: 83
End: 2018-02-28

## 2018-02-28 VITALS
HEART RATE: 87 BPM | RESPIRATION RATE: 20 BRPM | WEIGHT: 203.93 LBS | DIASTOLIC BLOOD PRESSURE: 75 MMHG | TEMPERATURE: 100 F | OXYGEN SATURATION: 96 % | SYSTOLIC BLOOD PRESSURE: 140 MMHG

## 2018-02-28 VITALS
SYSTOLIC BLOOD PRESSURE: 153 MMHG | DIASTOLIC BLOOD PRESSURE: 76 MMHG | OXYGEN SATURATION: 94 % | TEMPERATURE: 98 F | RESPIRATION RATE: 18 BRPM | HEART RATE: 62 BPM

## 2018-02-28 DIAGNOSIS — C61 MALIGNANT NEOPLASM OF PROSTATE: Chronic | ICD-10-CM

## 2018-02-28 LAB
ALBUMIN SERPL ELPH-MCNC: 3.9 G/DL — SIGNIFICANT CHANGE UP (ref 3.3–5)
ALP SERPL-CCNC: 49 U/L — SIGNIFICANT CHANGE UP (ref 40–120)
ALT FLD-CCNC: 38 U/L RC — SIGNIFICANT CHANGE UP (ref 10–45)
ANION GAP SERPL CALC-SCNC: 17 MMOL/L — SIGNIFICANT CHANGE UP (ref 5–17)
APPEARANCE UR: CLEAR — SIGNIFICANT CHANGE UP
APTT BLD: 29.5 SEC — SIGNIFICANT CHANGE UP (ref 27.5–37.4)
AST SERPL-CCNC: 32 U/L — SIGNIFICANT CHANGE UP (ref 10–40)
BASOPHILS # BLD AUTO: 0 K/UL — SIGNIFICANT CHANGE UP (ref 0–0.2)
BASOPHILS NFR BLD AUTO: 0 % — SIGNIFICANT CHANGE UP (ref 0–2)
BILIRUB SERPL-MCNC: 0.4 MG/DL — SIGNIFICANT CHANGE UP (ref 0.2–1.2)
BILIRUB UR-MCNC: NEGATIVE — SIGNIFICANT CHANGE UP
BUN SERPL-MCNC: 18 MG/DL — SIGNIFICANT CHANGE UP (ref 7–23)
CALCIUM SERPL-MCNC: 9.4 MG/DL — SIGNIFICANT CHANGE UP (ref 8.4–10.5)
CHLORIDE SERPL-SCNC: 96 MMOL/L — SIGNIFICANT CHANGE UP (ref 96–108)
CO2 SERPL-SCNC: 23 MMOL/L — SIGNIFICANT CHANGE UP (ref 22–31)
COLOR SPEC: COLORLESS — SIGNIFICANT CHANGE UP
CREAT SERPL-MCNC: 1.16 MG/DL — SIGNIFICANT CHANGE UP (ref 0.5–1.3)
DIFF PNL FLD: NEGATIVE — SIGNIFICANT CHANGE UP
EOSINOPHIL # BLD AUTO: 0 K/UL — SIGNIFICANT CHANGE UP (ref 0–0.5)
EOSINOPHIL NFR BLD AUTO: 0.9 % — SIGNIFICANT CHANGE UP (ref 0–6)
GLUCOSE SERPL-MCNC: 221 MG/DL — HIGH (ref 70–99)
GLUCOSE UR QL: NEGATIVE — SIGNIFICANT CHANGE UP
HCT VFR BLD CALC: 40.8 % — SIGNIFICANT CHANGE UP (ref 39–50)
HGB BLD-MCNC: 14.2 G/DL — SIGNIFICANT CHANGE UP (ref 13–17)
INR BLD: 1.36 RATIO — HIGH (ref 0.88–1.16)
KETONES UR-MCNC: NEGATIVE — SIGNIFICANT CHANGE UP
LEUKOCYTE ESTERASE UR-ACNC: NEGATIVE — SIGNIFICANT CHANGE UP
LIDOCAIN IGE QN: 14 U/L — SIGNIFICANT CHANGE UP (ref 7–60)
LYMPHOCYTES # BLD AUTO: 0.7 K/UL — LOW (ref 1–3.3)
LYMPHOCYTES # BLD AUTO: 12.2 % — LOW (ref 13–44)
MCHC RBC-ENTMCNC: 31.1 PG — SIGNIFICANT CHANGE UP (ref 27–34)
MCHC RBC-ENTMCNC: 34.9 GM/DL — SIGNIFICANT CHANGE UP (ref 32–36)
MCV RBC AUTO: 89.2 FL — SIGNIFICANT CHANGE UP (ref 80–100)
MONOCYTES # BLD AUTO: 0.4 K/UL — SIGNIFICANT CHANGE UP (ref 0–0.9)
MONOCYTES NFR BLD AUTO: 6.8 % — SIGNIFICANT CHANGE UP (ref 2–14)
NEUTROPHILS # BLD AUTO: 4.5 K/UL — SIGNIFICANT CHANGE UP (ref 1.8–7.4)
NEUTROPHILS NFR BLD AUTO: 80.2 % — HIGH (ref 43–77)
NITRITE UR-MCNC: NEGATIVE — SIGNIFICANT CHANGE UP
PH UR: 6.5 — SIGNIFICANT CHANGE UP (ref 5–8)
PLATELET # BLD AUTO: 217 K/UL — SIGNIFICANT CHANGE UP (ref 150–400)
POTASSIUM SERPL-MCNC: 3.6 MMOL/L — SIGNIFICANT CHANGE UP (ref 3.5–5.3)
POTASSIUM SERPL-SCNC: 3.6 MMOL/L — SIGNIFICANT CHANGE UP (ref 3.5–5.3)
PROT SERPL-MCNC: 7.3 G/DL — SIGNIFICANT CHANGE UP (ref 6–8.3)
PROT UR-MCNC: 100 MG/DL
PROTHROM AB SERPL-ACNC: 14.8 SEC — HIGH (ref 9.8–12.7)
RBC # BLD: 4.57 M/UL — SIGNIFICANT CHANGE UP (ref 4.2–5.8)
RBC # FLD: 13.7 % — SIGNIFICANT CHANGE UP (ref 10.3–14.5)
SODIUM SERPL-SCNC: 136 MMOL/L — SIGNIFICANT CHANGE UP (ref 135–145)
SP GR SPEC: 1.03 — HIGH (ref 1.01–1.02)
UROBILINOGEN FLD QL: NEGATIVE — SIGNIFICANT CHANGE UP
WBC # BLD: 5.6 K/UL — SIGNIFICANT CHANGE UP (ref 3.8–10.5)
WBC # FLD AUTO: 5.6 K/UL — SIGNIFICANT CHANGE UP (ref 3.8–10.5)

## 2018-02-28 PROCEDURE — 74177 CT ABD & PELVIS W/CONTRAST: CPT | Mod: 26

## 2018-02-28 PROCEDURE — 99285 EMERGENCY DEPT VISIT HI MDM: CPT

## 2018-02-28 RX ORDER — SODIUM CHLORIDE 9 MG/ML
1000 INJECTION, SOLUTION INTRAVENOUS ONCE
Qty: 0 | Refills: 0 | Status: COMPLETED | OUTPATIENT
Start: 2018-02-28 | End: 2018-02-28

## 2018-02-28 RX ORDER — ACETAMINOPHEN 500 MG
1000 TABLET ORAL ONCE
Qty: 0 | Refills: 0 | Status: COMPLETED | OUTPATIENT
Start: 2018-02-28 | End: 2018-02-28

## 2018-02-28 RX ORDER — ONDANSETRON 8 MG/1
4 TABLET, FILM COATED ORAL ONCE
Qty: 0 | Refills: 0 | Status: COMPLETED | OUTPATIENT
Start: 2018-02-28 | End: 2018-02-28

## 2018-02-28 RX ADMIN — Medication 400 MILLIGRAM(S): at 10:35

## 2018-02-28 RX ADMIN — ONDANSETRON 4 MILLIGRAM(S): 8 TABLET, FILM COATED ORAL at 10:34

## 2018-02-28 RX ADMIN — SODIUM CHLORIDE 1000 MILLILITER(S): 9 INJECTION, SOLUTION INTRAVENOUS at 10:35

## 2018-02-28 NOTE — ED ADULT NURSE NOTE - OBJECTIVE STATEMENT
81 yo M presents to ED A+Ox3 accompanied by his wife. Pt. reports having a dry "barking" cough for a week and a half, went to his PMD and during the exam was found to have LLQ tenderness and told to come to ED for a CT scan. Pt. denies fever, chills, SOB, vomiting, constipation, chest pain, changes in urinary pattern. LLQ tender to touch, states during palpation pain radiates to the RLQ. Abdomen soft, nondistended. Afebrile in ED. Lungs CTA, breathing unlabored on RA. Skin warm pink and dry. Reports intermittent nausea, denies vomiting. Reports "chronic" non-bloody diarrhea. Reports normal PO intake. Comfort and safety measures in place. Family at bedside.

## 2018-02-28 NOTE — ED PROVIDER NOTE - CARE PLAN
Principal Discharge DX:	Left lower quadrant pain  Secondary Diagnosis:	Nausea  Secondary Diagnosis:	Diarrhea, unspecified type

## 2018-02-28 NOTE — ED PROVIDER NOTE - NS ED ROS FT
No fever, no chills, no change in vision, no throat pain, no chest pain,  no joint pain, no rashes, no focal neurologic complaints,  all ROS otherwise as per HPI or negative.

## 2018-02-28 NOTE — ED PROVIDER NOTE - PHYSICAL EXAMINATION
Attending MD Hess: A & O x 3, NAD, EOMI b/l, PERRL b/l; lungs CTAB, heart with reg rhythm without murmur; abdomen soft, moderate distention, diffuse mild ttp, maximally in LLQ, no rebound or guarding; extremities with no edema; affect appropriate. neuro exam non focal with no motor or sensory deficits.

## 2018-02-28 NOTE — ED ADULT TRIAGE NOTE - CHIEF COMPLAINT QUOTE
I've been vomiting and I went to my doctor who pressed on my stomach and I had pain. He wants me to get a cat scan

## 2018-02-28 NOTE — ED PROVIDER NOTE - ATTENDING CONTRIBUTION TO CARE
Attending MD Hess:  I personally have seen and examined this patient.  Resident note reviewed and agree on plan of care and except where noted.  See HPI, PE, and MDM for details.

## 2018-02-28 NOTE — ED PROVIDER NOTE - PROGRESS NOTE DETAILS
Giselle: pain improved, CT neg, tolerated PO, pending UA results. Anticipate d/c with OP f/u. Giselle: UA neg will d/c.

## 2018-02-28 NOTE — ED ADULT NURSE REASSESSMENT NOTE - NS ED NURSE REASSESS COMMENT FT1
Pt. drinking for CT scan. family at bedside. Call bell within reach. warm blankets provided, pt. sitting up in position of comfort.
Pt. reminded to provide urine sample. Comfort and safety measures in place. Pending CT results.
pt. tolerated PO intake, denies N/V. Pending UA result.

## 2018-02-28 NOTE — ED PROVIDER NOTE - MEDICAL DECISION MAKING DETAILS
Attending MD Hess: 82M with DM, afib on Xarelto, HTN presenting with nausea and diarrhea x 2 days. Exam with ttp diffusely but mostly in LLQ, ddx includes colitis, diverticulitis, less likely SBO. Plan for CT a/p to eval, IV fluids and antiemetics Attending MD Hess: 82M with DM, afib on Xarelto, HTN presenting with nausea and diarrhea x 2 days. Exam with ttp diffusely but mostly in LLQ, ddx includes colitis, diverticulitis, less likely SBO. Plan for CT a/p to eval, IV fluids and antiemetics    Haviland: 82M w/PMH IBS, DM, htn, CAD, afib on xarelto p/w LLQ pain, n/d. R/o diverticulitis, renal stone/UTI, less likely internal hernia/SBO. CT, labs, IVF, analgesia/antiemetics prn, urine studies.

## 2018-02-28 NOTE — ED PROVIDER NOTE - OBJECTIVE STATEMENT
82M w/PMH GERD, prostate CA, LBBB, IBS (multiple cscopes reportedly normal, last >1y ago), DM, htn, afib on xarelto p/w LLQ abd pain, n/d (somewhat baseline) x2d. No vomiting but gagging. +dry cough. Denies f/c, cp/sob, dysuria/frequency/hematuria, prior abd surgeries, melena/BRBPR.

## 2018-03-05 ENCOUNTER — APPOINTMENT (OUTPATIENT)
Dept: CARDIOLOGY | Facility: CLINIC | Age: 83
End: 2018-03-05

## 2018-03-08 ENCOUNTER — MESSAGE (OUTPATIENT)
Age: 83
End: 2018-03-08

## 2018-03-08 ENCOUNTER — APPOINTMENT (OUTPATIENT)
Dept: CARDIOLOGY | Facility: CLINIC | Age: 83
End: 2018-03-08
Payer: MEDICARE

## 2018-03-08 PROCEDURE — 78452 HT MUSCLE IMAGE SPECT MULT: CPT

## 2018-03-08 PROCEDURE — 93015 CV STRESS TEST SUPVJ I&R: CPT

## 2018-03-08 PROCEDURE — A9500: CPT

## 2018-03-20 ENCOUNTER — RX RENEWAL (OUTPATIENT)
Age: 83
End: 2018-03-20

## 2018-03-22 ENCOUNTER — APPOINTMENT (OUTPATIENT)
Dept: CARDIOLOGY | Facility: CLINIC | Age: 83
End: 2018-03-22
Payer: MEDICARE

## 2018-03-22 ENCOUNTER — NON-APPOINTMENT (OUTPATIENT)
Age: 83
End: 2018-03-22

## 2018-03-22 VITALS
OXYGEN SATURATION: 95 % | SYSTOLIC BLOOD PRESSURE: 154 MMHG | HEART RATE: 70 BPM | DIASTOLIC BLOOD PRESSURE: 70 MMHG | BODY MASS INDEX: 33.9 KG/M2 | WEIGHT: 210 LBS

## 2018-03-22 DIAGNOSIS — M79.1 MYALGIA: ICD-10-CM

## 2018-03-22 DIAGNOSIS — I44.0 ATRIOVENTRICULAR BLOCK, FIRST DEGREE: ICD-10-CM

## 2018-03-22 PROCEDURE — 93040 RHYTHM ECG WITH REPORT: CPT | Mod: 59

## 2018-03-22 PROCEDURE — 99214 OFFICE O/P EST MOD 30 MIN: CPT

## 2018-03-22 PROCEDURE — 36415 COLL VENOUS BLD VENIPUNCTURE: CPT

## 2018-03-22 PROCEDURE — 93000 ELECTROCARDIOGRAM COMPLETE: CPT

## 2018-03-22 RX ORDER — ESCITALOPRAM OXALATE 20 MG/1
20 TABLET ORAL DAILY
Refills: 0 | Status: ACTIVE | COMMUNITY

## 2018-03-25 LAB
CHOLEST SERPL-MCNC: 140 MG/DL
CHOLEST/HDLC SERPL: 3.8 RATIO
CK SERPL-CCNC: 245 U/L
HDLC SERPL-MCNC: 37 MG/DL
LDLC SERPL CALC-MCNC: 30 MG/DL
LDLC SERPL DIRECT ASSAY-MCNC: 59 MG/DL
TRIGL SERPL-MCNC: 365 MG/DL

## 2018-03-26 ENCOUNTER — NON-APPOINTMENT (OUTPATIENT)
Age: 83
End: 2018-03-26

## 2018-03-29 ENCOUNTER — APPOINTMENT (OUTPATIENT)
Dept: INTERNAL MEDICINE | Facility: CLINIC | Age: 83
End: 2018-03-29
Payer: MEDICARE

## 2018-03-29 VITALS
TEMPERATURE: 98.5 F | WEIGHT: 204 LBS | HEART RATE: 72 BPM | SYSTOLIC BLOOD PRESSURE: 132 MMHG | BODY MASS INDEX: 32.78 KG/M2 | RESPIRATION RATE: 16 BRPM | DIASTOLIC BLOOD PRESSURE: 80 MMHG | OXYGEN SATURATION: 97 % | HEIGHT: 66 IN

## 2018-03-29 DIAGNOSIS — I73.9 PERIPHERAL VASCULAR DISEASE, UNSPECIFIED: ICD-10-CM

## 2018-03-29 PROCEDURE — 94010 BREATHING CAPACITY TEST: CPT

## 2018-03-29 PROCEDURE — 99214 OFFICE O/P EST MOD 30 MIN: CPT | Mod: 25

## 2018-04-02 ENCOUNTER — RX RENEWAL (OUTPATIENT)
Age: 83
End: 2018-04-02

## 2018-04-02 RX ORDER — ZOLPIDEM TARTRATE 10 MG/1
10 TABLET ORAL
Qty: 30 | Refills: 2 | Status: DISCONTINUED | COMMUNITY
Start: 2017-12-28 | End: 2018-04-02

## 2018-04-03 ENCOUNTER — APPOINTMENT (OUTPATIENT)
Dept: INTERNAL MEDICINE | Facility: CLINIC | Age: 83
End: 2018-04-03
Payer: MEDICARE

## 2018-04-03 ENCOUNTER — MESSAGE (OUTPATIENT)
Age: 83
End: 2018-04-03

## 2018-04-03 PROCEDURE — 93922 UPR/L XTREMITY ART 2 LEVELS: CPT

## 2018-04-12 ENCOUNTER — RESULT CHARGE (OUTPATIENT)
Age: 83
End: 2018-04-12

## 2018-04-19 ENCOUNTER — APPOINTMENT (OUTPATIENT)
Dept: CARDIOLOGY | Facility: CLINIC | Age: 83
End: 2018-04-19

## 2018-04-24 ENCOUNTER — APPOINTMENT (OUTPATIENT)
Dept: CARDIOLOGY | Facility: CLINIC | Age: 83
End: 2018-04-24
Payer: MEDICARE

## 2018-04-24 ENCOUNTER — NON-APPOINTMENT (OUTPATIENT)
Age: 83
End: 2018-04-24

## 2018-04-24 VITALS — HEART RATE: 78 BPM | DIASTOLIC BLOOD PRESSURE: 66 MMHG | SYSTOLIC BLOOD PRESSURE: 118 MMHG | OXYGEN SATURATION: 96 %

## 2018-04-24 VITALS — DIASTOLIC BLOOD PRESSURE: 58 MMHG | SYSTOLIC BLOOD PRESSURE: 120 MMHG

## 2018-04-24 PROCEDURE — 93040 RHYTHM ECG WITH REPORT: CPT | Mod: 59

## 2018-04-24 PROCEDURE — 93000 ELECTROCARDIOGRAM COMPLETE: CPT

## 2018-04-24 PROCEDURE — 99214 OFFICE O/P EST MOD 30 MIN: CPT

## 2018-04-29 RX ORDER — SIMVASTATIN 20 MG/1
20 TABLET, FILM COATED ORAL DAILY
Refills: 0 | Status: DISCONTINUED | COMMUNITY
End: 2018-04-29

## 2018-04-29 RX ORDER — ATORVASTATIN CALCIUM 20 MG/1
20 TABLET, FILM COATED ORAL DAILY
Qty: 90 | Refills: 3 | Status: DISCONTINUED | COMMUNITY
Start: 2018-03-22 | End: 2018-04-29

## 2018-05-02 ENCOUNTER — RX RENEWAL (OUTPATIENT)
Age: 83
End: 2018-05-02

## 2018-05-04 ENCOUNTER — RX RENEWAL (OUTPATIENT)
Age: 83
End: 2018-05-04

## 2018-05-07 ENCOUNTER — NON-APPOINTMENT (OUTPATIENT)
Age: 83
End: 2018-05-07

## 2018-05-14 ENCOUNTER — NON-APPOINTMENT (OUTPATIENT)
Age: 83
End: 2018-05-14

## 2018-05-14 ENCOUNTER — APPOINTMENT (OUTPATIENT)
Dept: CARDIOLOGY | Facility: CLINIC | Age: 83
End: 2018-05-14
Payer: MEDICARE

## 2018-05-14 VITALS
BODY MASS INDEX: 34.22 KG/M2 | WEIGHT: 212 LBS | DIASTOLIC BLOOD PRESSURE: 70 MMHG | SYSTOLIC BLOOD PRESSURE: 140 MMHG | OXYGEN SATURATION: 96 % | HEART RATE: 67 BPM

## 2018-05-14 VITALS — SYSTOLIC BLOOD PRESSURE: 130 MMHG | DIASTOLIC BLOOD PRESSURE: 62 MMHG

## 2018-05-14 DIAGNOSIS — M79.606 PAIN IN LEG, UNSPECIFIED: ICD-10-CM

## 2018-05-14 PROCEDURE — 93000 ELECTROCARDIOGRAM COMPLETE: CPT

## 2018-05-14 PROCEDURE — 93040 RHYTHM ECG WITH REPORT: CPT | Mod: 59

## 2018-05-14 PROCEDURE — 99214 OFFICE O/P EST MOD 30 MIN: CPT

## 2018-05-28 ENCOUNTER — NON-APPOINTMENT (OUTPATIENT)
Age: 83
End: 2018-05-28

## 2018-06-29 ENCOUNTER — LABORATORY RESULT (OUTPATIENT)
Age: 83
End: 2018-06-29

## 2018-06-29 ENCOUNTER — APPOINTMENT (OUTPATIENT)
Dept: INTERNAL MEDICINE | Facility: CLINIC | Age: 83
End: 2018-06-29
Payer: MEDICARE

## 2018-06-29 VITALS
SYSTOLIC BLOOD PRESSURE: 120 MMHG | HEIGHT: 66 IN | BODY MASS INDEX: 33.43 KG/M2 | WEIGHT: 208 LBS | OXYGEN SATURATION: 97 % | TEMPERATURE: 98.2 F | DIASTOLIC BLOOD PRESSURE: 78 MMHG | RESPIRATION RATE: 16 BRPM | HEART RATE: 68 BPM

## 2018-06-29 PROCEDURE — 99214 OFFICE O/P EST MOD 30 MIN: CPT | Mod: 25

## 2018-06-29 PROCEDURE — 36415 COLL VENOUS BLD VENIPUNCTURE: CPT

## 2018-06-29 PROCEDURE — 94010 BREATHING CAPACITY TEST: CPT

## 2018-06-29 RX ORDER — ROSUVASTATIN CALCIUM 10 MG/1
10 TABLET, FILM COATED ORAL
Qty: 90 | Refills: 3 | Status: ACTIVE | COMMUNITY
Start: 2018-06-29 | End: 1900-01-01

## 2018-06-29 NOTE — HISTORY OF PRESENT ILLNESS
[FreeTextEntry1] : Followup  for diabetes mellitus, hypercholesterolemia, chronic diarrhea, cardiomyopathy [de-identified] : Feels well except for episodic explosive diarrhea. This has been going on for years. Workups have been unrevealing with a working diagnosis of IBS.\par \par He has been off of simvastatin for the past 2 months because of leg cramps.

## 2018-06-29 NOTE — REVIEW OF SYSTEMS
[Diarrhea] : diarrhea [Hesitancy] : hesitancy [Nocturia] : nocturia [Negative] : Cardiovascular [Shortness Of Breath] : no shortness of breath [Wheezing] : no wheezing [Cough] : no cough [Dyspnea on Exertion] : not dyspnea on exertion [Abdominal Pain] : no abdominal pain [Nausea] : no nausea [Constipation] : no constipation [Vomiting] : no vomiting [Heartburn] : no heartburn [Melena] : no melena [FreeTextEntry7] : See present illness [FreeTextEntry8] : No change

## 2018-06-29 NOTE — HEALTH RISK ASSESSMENT
[No falls in past year] : Patient reported no falls in the past year [0] : 1) Little interest or pleasure doing things: Not at all (0) [] : No [de-identified] : Cardiology [[id number]] : 0

## 2018-06-29 NOTE — DATA REVIEWED
[FreeTextEntry1] : Spirometry is normal. Pulse oximetry is 97%.\par \par Blood test results are pending.

## 2018-06-29 NOTE — COUNSELING
[Weight management counseling provided] : Weight management [Target Wt Loss Goal ___] : Target weight loss goal [unfilled] lbs

## 2018-06-29 NOTE — PLAN
[FreeTextEntry1] : The patient will start rosuvastatin 10 mg. He has been taking coenzyme Q10 for years.\par \par Inflammatory markers will be drawn.\par \par Same Rx for now.

## 2018-06-30 LAB
ALBUMIN SERPL ELPH-MCNC: 4.1 G/DL
ALP BLD-CCNC: 41 U/L
ALT SERPL-CCNC: 33 U/L
ANION GAP SERPL CALC-SCNC: 17 MMOL/L
AST SERPL-CCNC: 28 U/L
BASOPHILS # BLD AUTO: 0.04 K/UL
BASOPHILS NFR BLD AUTO: 0.7 %
BILIRUB SERPL-MCNC: 0.3 MG/DL
BUN SERPL-MCNC: 24 MG/DL
CALCIUM SERPL-MCNC: 9.9 MG/DL
CHLORIDE SERPL-SCNC: 101 MMOL/L
CHOLEST SERPL-MCNC: 186 MG/DL
CHOLEST/HDLC SERPL: 5.5 RATIO
CO2 SERPL-SCNC: 23 MMOL/L
CREAT SERPL-MCNC: 1.22 MG/DL
EOSINOPHIL # BLD AUTO: 0.16 K/UL
EOSINOPHIL NFR BLD AUTO: 2.8 %
GLUCOSE SERPL-MCNC: 144 MG/DL
HBA1C MFR BLD HPLC: 6.4 %
HCT VFR BLD CALC: 42 %
HDLC SERPL-MCNC: 34 MG/DL
HGB BLD-MCNC: 14.1 G/DL
IMM GRANULOCYTES NFR BLD AUTO: 0.3 %
LDLC SERPL CALC-MCNC: NORMAL
LYMPHOCYTES # BLD AUTO: 1.64 K/UL
LYMPHOCYTES NFR BLD AUTO: 28.6 %
MAN DIFF?: NORMAL
MCHC RBC-ENTMCNC: 30 PG
MCHC RBC-ENTMCNC: 33.6 GM/DL
MCV RBC AUTO: 89.4 FL
MONOCYTES # BLD AUTO: 0.45 K/UL
MONOCYTES NFR BLD AUTO: 7.9 %
NEUTROPHILS # BLD AUTO: 3.42 K/UL
NEUTROPHILS NFR BLD AUTO: 59.7 %
PLATELET # BLD AUTO: 241 K/UL
POTASSIUM SERPL-SCNC: 4.2 MMOL/L
PROT SERPL-MCNC: 7.2 G/DL
RBC # BLD: 4.7 M/UL
RBC # FLD: 15.4 %
SODIUM SERPL-SCNC: 141 MMOL/L
T3 SERPL-MCNC: 109 NG/DL
T4 SERPL-MCNC: 7.3 UG/DL
TRIGL SERPL-MCNC: 437 MG/DL
TSH SERPL-ACNC: 4.14 UIU/ML
URATE SERPL-MCNC: 7.9 MG/DL
WBC # FLD AUTO: 5.73 K/UL

## 2018-07-02 LAB
BAKER'S YEAST AB QL: 37.6 UNITS
BAKER'S YEAST IGA QL IA: 28.9 UNITS
BAKER'S YEAST IGA QN IA: ABNORMAL
BAKER'S YEAST IGG QN IA: POSITIVE
ERYTHROCYTE [SEDIMENTATION RATE] IN BLOOD BY WESTERGREN METHOD: 9 MM/HR
MPO AB + PR3 PNL SER: NORMAL

## 2018-07-02 RX ORDER — COLCHICINE 0.6 MG/1
0.6 TABLET, FILM COATED ORAL DAILY
Qty: 90 | Refills: 3 | Status: ACTIVE | COMMUNITY
Start: 2018-07-02 | End: 1900-01-01

## 2018-08-04 ENCOUNTER — RX RENEWAL (OUTPATIENT)
Age: 83
End: 2018-08-04

## 2018-09-04 ENCOUNTER — RX RENEWAL (OUTPATIENT)
Age: 83
End: 2018-09-04

## 2018-09-07 ENCOUNTER — APPOINTMENT (OUTPATIENT)
Dept: INTERNAL MEDICINE | Facility: CLINIC | Age: 83
End: 2018-09-07
Payer: MEDICARE

## 2018-09-07 VITALS
WEIGHT: 210 LBS | OXYGEN SATURATION: 96 % | SYSTOLIC BLOOD PRESSURE: 134 MMHG | DIASTOLIC BLOOD PRESSURE: 60 MMHG | TEMPERATURE: 98.3 F | BODY MASS INDEX: 33.75 KG/M2 | HEIGHT: 66 IN | RESPIRATION RATE: 16 BRPM | HEART RATE: 63 BPM

## 2018-09-07 DIAGNOSIS — K58.9 IRRITABLE BOWEL SYNDROME W/OUT DIARRHEA: ICD-10-CM

## 2018-09-07 DIAGNOSIS — I47.2 VENTRICULAR TACHYCARDIA: ICD-10-CM

## 2018-09-07 DIAGNOSIS — I73.9 PERIPHERAL VASCULAR DISEASE, UNSPECIFIED: ICD-10-CM

## 2018-09-07 DIAGNOSIS — R53.83 OTHER FATIGUE: ICD-10-CM

## 2018-09-07 DIAGNOSIS — E78.5 HYPERLIPIDEMIA, UNSPECIFIED: ICD-10-CM

## 2018-09-07 PROCEDURE — 36415 COLL VENOUS BLD VENIPUNCTURE: CPT

## 2018-09-07 PROCEDURE — 99214 OFFICE O/P EST MOD 30 MIN: CPT | Mod: 25

## 2018-09-07 PROCEDURE — 94010 BREATHING CAPACITY TEST: CPT

## 2018-09-07 NOTE — HEALTH RISK ASSESSMENT
[No falls in past year] : Patient reported no falls in the past year [0] : 2) Feeling down, depressed, or hopeless: Not at all (0) [] : No [de-identified] : Cardiology,  GI [[id number]] : 0

## 2018-09-07 NOTE — ASSESSMENT
[FreeTextEntry1] : Metformin will be re from 1500 debra day to 500 mg daily with dinner of ER preparation.  He will call in 4 we. Fasting blood work will be rechecked in 8 weeks.

## 2018-09-07 NOTE — HISTORY OF PRESENT ILLNESS
[FreeTextEntry1] : Followup for diabetes mellitus,  hypertension, diarrhea, cataract [de-identified] : He has been having diarrhea up to 6-8 bowel movements with extreme urgency for many months. GI has not contributed a diagnosis. A temporary reduction of metformin from 5495-2610 resulted in no change.

## 2018-09-07 NOTE — COUNSELING
[Weight management counseling provided] : Weight management [Target Wt Loss Goal ___] : Target weight loss goal [unfilled] lbs [Decrease Portions] : Decrease food portions [Walking] : Walking [de-identified] : Diarrhea is an ongoing issue

## 2018-09-19 ENCOUNTER — RX RENEWAL (OUTPATIENT)
Age: 83
End: 2018-09-19

## 2018-10-03 ENCOUNTER — APPOINTMENT (OUTPATIENT)
Dept: ORTHOPEDIC SURGERY | Facility: CLINIC | Age: 83
End: 2018-10-03
Payer: MEDICARE

## 2018-10-03 DIAGNOSIS — M17.12 UNILATERAL PRIMARY OSTEOARTHRITIS, LEFT KNEE: ICD-10-CM

## 2018-10-03 DIAGNOSIS — M17.11 UNILATERAL PRIMARY OSTEOARTHRITIS, RIGHT KNEE: ICD-10-CM

## 2018-10-03 PROCEDURE — 99213 OFFICE O/P EST LOW 20 MIN: CPT | Mod: 25

## 2018-10-03 PROCEDURE — 20610 DRAIN/INJ JOINT/BURSA W/O US: CPT | Mod: 50

## 2018-10-03 PROCEDURE — 73564 X-RAY EXAM KNEE 4 OR MORE: CPT | Mod: 50

## 2018-10-05 ENCOUNTER — RX RENEWAL (OUTPATIENT)
Age: 83
End: 2018-10-05

## 2018-10-05 RX ORDER — ZOLPIDEM TARTRATE 10 MG/1
10 TABLET ORAL
Qty: 30 | Refills: 2 | Status: ACTIVE | COMMUNITY
Start: 2018-04-02 | End: 1900-01-01

## 2018-10-08 ENCOUNTER — EMERGENCY (EMERGENCY)
Facility: HOSPITAL | Age: 83
LOS: 1 days | End: 2018-10-08
Attending: EMERGENCY MEDICINE
Payer: MEDICARE

## 2018-10-08 VITALS
SYSTOLIC BLOOD PRESSURE: 176 MMHG | RESPIRATION RATE: 20 BRPM | HEART RATE: 79 BPM | TEMPERATURE: 98 F | DIASTOLIC BLOOD PRESSURE: 76 MMHG | OXYGEN SATURATION: 96 %

## 2018-10-08 VITALS
DIASTOLIC BLOOD PRESSURE: 64 MMHG | OXYGEN SATURATION: 100 % | RESPIRATION RATE: 18 BRPM | TEMPERATURE: 98 F | SYSTOLIC BLOOD PRESSURE: 154 MMHG | HEART RATE: 64 BPM

## 2018-10-08 DIAGNOSIS — C61 MALIGNANT NEOPLASM OF PROSTATE: Chronic | ICD-10-CM

## 2018-10-08 LAB
ALBUMIN SERPL ELPH-MCNC: 3.8 G/DL — SIGNIFICANT CHANGE UP (ref 3.3–5)
ALP SERPL-CCNC: 51 U/L — SIGNIFICANT CHANGE UP (ref 40–120)
ALT FLD-CCNC: 50 U/L — HIGH (ref 10–45)
ANION GAP SERPL CALC-SCNC: 12 MMOL/L — SIGNIFICANT CHANGE UP (ref 5–17)
APPEARANCE UR: CLEAR — SIGNIFICANT CHANGE UP
APTT BLD: 31.2 SEC — SIGNIFICANT CHANGE UP (ref 27.5–37.4)
AST SERPL-CCNC: 30 U/L — SIGNIFICANT CHANGE UP (ref 10–40)
BASOPHILS # BLD AUTO: 0.1 K/UL — SIGNIFICANT CHANGE UP (ref 0–0.2)
BASOPHILS NFR BLD AUTO: 1.3 % — SIGNIFICANT CHANGE UP (ref 0–2)
BILIRUB SERPL-MCNC: 0.3 MG/DL — SIGNIFICANT CHANGE UP (ref 0.2–1.2)
BILIRUB UR-MCNC: NEGATIVE — SIGNIFICANT CHANGE UP
BUN SERPL-MCNC: 22 MG/DL — SIGNIFICANT CHANGE UP (ref 7–23)
CALCIUM SERPL-MCNC: 9.2 MG/DL — SIGNIFICANT CHANGE UP (ref 8.4–10.5)
CHLORIDE SERPL-SCNC: 99 MMOL/L — SIGNIFICANT CHANGE UP (ref 96–108)
CO2 SERPL-SCNC: 24 MMOL/L — SIGNIFICANT CHANGE UP (ref 22–31)
COLOR SPEC: SIGNIFICANT CHANGE UP
CREAT SERPL-MCNC: 1.12 MG/DL — SIGNIFICANT CHANGE UP (ref 0.5–1.3)
DIFF PNL FLD: ABNORMAL
EOSINOPHIL # BLD AUTO: 0.2 K/UL — SIGNIFICANT CHANGE UP (ref 0–0.5)
EOSINOPHIL NFR BLD AUTO: 3.4 % — SIGNIFICANT CHANGE UP (ref 0–6)
GAS PNL BLDV: SIGNIFICANT CHANGE UP
GAS PNL BLDV: SIGNIFICANT CHANGE UP
GLUCOSE SERPL-MCNC: 206 MG/DL — HIGH (ref 70–99)
GLUCOSE UR QL: ABNORMAL
HCT VFR BLD CALC: 41.7 % — SIGNIFICANT CHANGE UP (ref 39–50)
HGB BLD-MCNC: 14.6 G/DL — SIGNIFICANT CHANGE UP (ref 13–17)
INR BLD: 1.29 RATIO — HIGH (ref 0.88–1.16)
KETONES UR-MCNC: NEGATIVE — SIGNIFICANT CHANGE UP
LEUKOCYTE ESTERASE UR-ACNC: NEGATIVE — SIGNIFICANT CHANGE UP
LYMPHOCYTES # BLD AUTO: 1.8 K/UL — SIGNIFICANT CHANGE UP (ref 1–3.3)
LYMPHOCYTES # BLD AUTO: 29.2 % — SIGNIFICANT CHANGE UP (ref 13–44)
MCHC RBC-ENTMCNC: 31 PG — SIGNIFICANT CHANGE UP (ref 27–34)
MCHC RBC-ENTMCNC: 35 GM/DL — SIGNIFICANT CHANGE UP (ref 32–36)
MCV RBC AUTO: 88.6 FL — SIGNIFICANT CHANGE UP (ref 80–100)
MONOCYTES # BLD AUTO: 0.6 K/UL — SIGNIFICANT CHANGE UP (ref 0–0.9)
MONOCYTES NFR BLD AUTO: 9 % — SIGNIFICANT CHANGE UP (ref 2–14)
NEUTROPHILS # BLD AUTO: 3.5 K/UL — SIGNIFICANT CHANGE UP (ref 1.8–7.4)
NEUTROPHILS NFR BLD AUTO: 57.1 % — SIGNIFICANT CHANGE UP (ref 43–77)
NITRITE UR-MCNC: NEGATIVE — SIGNIFICANT CHANGE UP
PH UR: 6 — SIGNIFICANT CHANGE UP (ref 5–8)
PLATELET # BLD AUTO: 279 K/UL — SIGNIFICANT CHANGE UP (ref 150–400)
POTASSIUM SERPL-MCNC: 3.9 MMOL/L — SIGNIFICANT CHANGE UP (ref 3.5–5.3)
POTASSIUM SERPL-SCNC: 3.9 MMOL/L — SIGNIFICANT CHANGE UP (ref 3.5–5.3)
PROT SERPL-MCNC: 6.7 G/DL — SIGNIFICANT CHANGE UP (ref 6–8.3)
PROT UR-MCNC: ABNORMAL
PROTHROM AB SERPL-ACNC: 14 SEC — HIGH (ref 9.8–12.7)
RBC # BLD: 4.71 M/UL — SIGNIFICANT CHANGE UP (ref 4.2–5.8)
RBC # FLD: 13.7 % — SIGNIFICANT CHANGE UP (ref 10.3–14.5)
SODIUM SERPL-SCNC: 135 MMOL/L — SIGNIFICANT CHANGE UP (ref 135–145)
SP GR SPEC: 1.02 — SIGNIFICANT CHANGE UP (ref 1.01–1.02)
TROPONIN T, HIGH SENSITIVITY RESULT: 37 NG/L — SIGNIFICANT CHANGE UP (ref 0–51)
TROPONIN T, HIGH SENSITIVITY RESULT: 38 NG/L — SIGNIFICANT CHANGE UP (ref 0–51)
UROBILINOGEN FLD QL: NEGATIVE — SIGNIFICANT CHANGE UP
WBC # BLD: 6.2 K/UL — SIGNIFICANT CHANGE UP (ref 3.8–10.5)
WBC # FLD AUTO: 6.2 K/UL — SIGNIFICANT CHANGE UP (ref 3.8–10.5)

## 2018-10-08 PROCEDURE — 82565 ASSAY OF CREATININE: CPT

## 2018-10-08 PROCEDURE — 81001 URINALYSIS AUTO W/SCOPE: CPT

## 2018-10-08 PROCEDURE — 80053 COMPREHEN METABOLIC PANEL: CPT

## 2018-10-08 PROCEDURE — 93010 ELECTROCARDIOGRAM REPORT: CPT

## 2018-10-08 PROCEDURE — 71250 CT THORAX DX C-: CPT

## 2018-10-08 PROCEDURE — 87086 URINE CULTURE/COLONY COUNT: CPT

## 2018-10-08 PROCEDURE — 83605 ASSAY OF LACTIC ACID: CPT

## 2018-10-08 PROCEDURE — 82803 BLOOD GASES ANY COMBINATION: CPT

## 2018-10-08 PROCEDURE — 96361 HYDRATE IV INFUSION ADD-ON: CPT

## 2018-10-08 PROCEDURE — 84484 ASSAY OF TROPONIN QUANT: CPT

## 2018-10-08 PROCEDURE — 82962 GLUCOSE BLOOD TEST: CPT

## 2018-10-08 PROCEDURE — 85730 THROMBOPLASTIN TIME PARTIAL: CPT

## 2018-10-08 PROCEDURE — 71250 CT THORAX DX C-: CPT | Mod: 26

## 2018-10-08 PROCEDURE — 96374 THER/PROPH/DIAG INJ IV PUSH: CPT

## 2018-10-08 PROCEDURE — 99284 EMERGENCY DEPT VISIT MOD MDM: CPT | Mod: 25

## 2018-10-08 PROCEDURE — 84295 ASSAY OF SERUM SODIUM: CPT

## 2018-10-08 PROCEDURE — 85610 PROTHROMBIN TIME: CPT

## 2018-10-08 PROCEDURE — 84132 ASSAY OF SERUM POTASSIUM: CPT

## 2018-10-08 PROCEDURE — 82330 ASSAY OF CALCIUM: CPT

## 2018-10-08 PROCEDURE — 93005 ELECTROCARDIOGRAM TRACING: CPT

## 2018-10-08 PROCEDURE — 71045 X-RAY EXAM CHEST 1 VIEW: CPT | Mod: 26

## 2018-10-08 PROCEDURE — 71045 X-RAY EXAM CHEST 1 VIEW: CPT

## 2018-10-08 PROCEDURE — 82947 ASSAY GLUCOSE BLOOD QUANT: CPT

## 2018-10-08 PROCEDURE — 85014 HEMATOCRIT: CPT

## 2018-10-08 PROCEDURE — 82435 ASSAY OF BLOOD CHLORIDE: CPT

## 2018-10-08 PROCEDURE — 85027 COMPLETE CBC AUTOMATED: CPT

## 2018-10-08 RX ORDER — SODIUM CHLORIDE 9 MG/ML
1000 INJECTION INTRAMUSCULAR; INTRAVENOUS; SUBCUTANEOUS ONCE
Qty: 0 | Refills: 0 | Status: COMPLETED | OUTPATIENT
Start: 2018-10-08 | End: 2018-10-08

## 2018-10-08 RX ORDER — CHLORPROMAZINE HCL 10 MG
25 TABLET ORAL ONCE
Qty: 0 | Refills: 0 | Status: COMPLETED | OUTPATIENT
Start: 2018-10-08 | End: 2018-10-08

## 2018-10-08 RX ORDER — FAMOTIDINE 10 MG/ML
20 INJECTION INTRAVENOUS ONCE
Qty: 0 | Refills: 0 | Status: COMPLETED | OUTPATIENT
Start: 2018-10-08 | End: 2018-10-08

## 2018-10-08 RX ORDER — FAMOTIDINE 10 MG/ML
1 INJECTION INTRAVENOUS
Qty: 6 | Refills: 0 | OUTPATIENT
Start: 2018-10-08 | End: 2018-10-10

## 2018-10-08 RX ORDER — CHLORPROMAZINE HCL 10 MG
1 TABLET ORAL
Qty: 9 | Refills: 0 | OUTPATIENT
Start: 2018-10-08 | End: 2018-10-10

## 2018-10-08 RX ADMIN — Medication 25 MILLIGRAM(S): at 09:18

## 2018-10-08 RX ADMIN — SODIUM CHLORIDE 1000 MILLILITER(S): 9 INJECTION INTRAMUSCULAR; INTRAVENOUS; SUBCUTANEOUS at 09:18

## 2018-10-08 RX ADMIN — FAMOTIDINE 20 MILLIGRAM(S): 10 INJECTION INTRAVENOUS at 09:18

## 2018-10-08 RX ADMIN — SODIUM CHLORIDE 1000 MILLILITER(S): 9 INJECTION INTRAMUSCULAR; INTRAVENOUS; SUBCUTANEOUS at 10:25

## 2018-10-08 RX ADMIN — SODIUM CHLORIDE 1000 MILLILITER(S): 9 INJECTION INTRAMUSCULAR; INTRAVENOUS; SUBCUTANEOUS at 11:37

## 2018-10-08 NOTE — ED PROVIDER NOTE - PROGRESS NOTE DETAILS
hicoughs improved w thorazine trace pleural effusion on ct - will refer to pulm, thyroid nodule refer to pcp

## 2018-10-08 NOTE — ED PROVIDER NOTE - OBJECTIVE STATEMENT
82 year old male with Afib on Xarelto, DM, HTN, presenting with 1 week h/o of hiccups and 2 days of difficulty breathing. Patient states hiccups started without any trigger, and has been constant, worsening, and only water makes it better. Endorses stable PO intake. Also endorsing difficulty breathing for 2 days due to hiccups, but denies any CP, LOC, fevers, abdominal pain or back pain. Denies any weakness or numbness, denies N/V/D.    Has had hiccups previously but denies duration >1 day previously.

## 2018-10-08 NOTE — ED ADULT NURSE REASSESSMENT NOTE - NS ED NURSE REASSESS COMMENT FT1
pt continues to experience hiccups but states improvement. airway patent, speech clear, breathing unlabored. pt waiting comfortably in stretcher. safety maintained, side rails up, call be within reach.

## 2018-10-08 NOTE — ED PROVIDER NOTE - MEDICAL DECISION MAKING DETAILS
82 year old male with Afib on xarelto, DM, reflux, and HTN presenting with hiccups and difficulty breathing. 82 year old male with Afib on xarelto, DM, reflux, and HTN presenting with hiccups and difficulty breathing. Will assess ACS, 82 year old male with Afib on xarelto, DM, reflux, and HTN presenting with hiccups and difficulty breathing. Will assess ACS, infection, and GI etiology with basic labs, troponins, xray, CT.Will give zantac and thorazine for hiccups. Will reassess. 82 year old male with Afib on xarelto, DM, reflux, and HTN presenting with hiccups and difficulty breathing. Will assess ACS, infection, and GI etiology with basic labs, troponins, xray, CT.Will give zantac and thorazine for hiccups. Will reassess.  gonzalo persistant hiccoughs with sob - no pain -- will give thorazine - saqts 99 - lungs cleat - ct r/o diaghramatic irritation -- if neg fu gi

## 2018-10-08 NOTE — ED ADULT NURSE NOTE - OBJECTIVE STATEMENT
83 YO male PMHx a-fib on Xarelto c/o persistent hiccups for one week and endorses SOB for the last two days. Pt states that "the only thing that helps is water". Pt speech interrupted by hiccups but otherwise clear and coherent. Pt breathing unlabored, chest rise equal and symmetric. Pt A&OX4, ambulatory, no complaints of HA, CP, and currently not short of breath. Pt placed on CM, a-fib on tele. Abdomen soft, non-tender non-distended. VSS. Family at bedside.  pt waiting comfortably on stretcher. safety maintained. 81 YO male PMHx a-fib on Xarelto c/o persistent hiccups for one week and endorses SOB for the last two days. Pt states that "the only thing that helps is water". Pt speech interrupted by hiccups but otherwise clear and coherent. Pt breathing unlabored, chest rise equal and symmetric. Pt denies any recent surgery, musculoskeletal dx, or alcohol/drug use. Pt A&OX4, ambulatory, no complaints of HA, CP, and currently not short of breath. Pt placed on CM, a-fib on tele. Abdomen soft, non-tender non-distended. VSS. Family at bedside.  pt waiting comfortably on stretcher. safety maintained. 81 YO male PMHx a-fib on Xarelto c/o persistent hiccups for one week and endorses SOB for the last two days. Pt states that "the only thing that helps is water". Pt speech interrupted by hiccups but otherwise clear and coherent. Pt breathing unlabored, chest rise equal and symmetric. Pt denies any recent surgery, musculoskeletal dx, or alcohol/drug use. Pt A&OX4, ambulatory, no complaints of HA, CP, and currently not short of breath. Pt. able to speak in clear, coherent sentences. Lung sounds clear bilaterally. Pt placed on CM, a-fib to the 70s on tele. Abdomen soft, non-tender non-distended. VSS. Family at bedside.  pt waiting comfortably on stretcher. safety maintained. Family at bedside. 83 YO male PMHx a-fib on Xarelto c/o persistent hiccups for one week and endorses SOB for the last two days. Pt states that "the only thing that helps is water". Pt speech interrupted by hiccups but otherwise clear and coherent. Pt breathing unlabored, chest rise equal and symmetric. Pt denies any recent surgery, musculoskeletal dx, or alcohol/drug use. Pt A&OX4, ambulatory, no complaints of HA, CP, and currently not short of breath. Pt. able to speak in clear, coherent sentences. Airway patent. O2 sat 98% on RA. Lung sounds clear bilaterally. Pt placed on CM, a-fib to the 70s on tele. Abdomen soft, non-tender non-distended. VSS. Family at bedside.  pt waiting comfortably on stretcher. side rails up. Red socks and yellow band applied. Call bell within reach. safety maintained. Family at bedside.

## 2018-10-08 NOTE — ED ADULT NURSE NOTE - NSIMPLEMENTINTERV_GEN_ALL_ED
Implemented All Universal Safety Interventions:  [place] to call system. Call bell, personal items and telephone within reach. Instruct patient to call for assistance. Room bathroom lighting operational. Non-slip footwear when patient is off stretcher. Physically safe environment: no spills, clutter or unnecessary equipment. Stretcher in lowest position, wheels locked, appropriate side rails in place. Implemented All Fall with Harm Risk Interventions:  [place] to call system. Call bell, personal items and telephone within reach. Instruct patient to call for assistance. Room bathroom lighting operational. Non-slip footwear when patient is off stretcher. Physically safe environment: no spills, clutter or unnecessary equipment. Stretcher in lowest position, wheels locked, appropriate side rails in place. Provide visual cue, wrist band, yellow gown, etc. Monitor gait and stability. Monitor for mental status changes and reorient to person, place, and time. Review medications for side effects contributing to fall risk. Reinforce activity limits and safety measures with patient and family. Provide visual clues: red socks.

## 2018-10-09 LAB
CULTURE RESULTS: SIGNIFICANT CHANGE UP
SPECIMEN SOURCE: SIGNIFICANT CHANGE UP

## 2018-10-09 RX ORDER — RIVAROXABAN 15 MG-20MG
0 KIT ORAL
Qty: 0 | Refills: 0 | COMMUNITY

## 2018-10-10 ENCOUNTER — EMERGENCY (EMERGENCY)
Facility: HOSPITAL | Age: 83
LOS: 1 days | Discharge: HOME | End: 2018-10-10
Attending: EMERGENCY MEDICINE
Payer: MEDICARE

## 2018-10-10 VITALS
TEMPERATURE: 98 F | DIASTOLIC BLOOD PRESSURE: 77 MMHG | HEIGHT: 66 IN | WEIGHT: 210.1 LBS | OXYGEN SATURATION: 96 % | HEART RATE: 62 BPM | RESPIRATION RATE: 20 BRPM | SYSTOLIC BLOOD PRESSURE: 149 MMHG

## 2018-10-10 DIAGNOSIS — C61 MALIGNANT NEOPLASM OF PROSTATE: Chronic | ICD-10-CM

## 2018-10-10 PROCEDURE — 99283 EMERGENCY DEPT VISIT LOW MDM: CPT

## 2018-10-10 RX ORDER — CHLORPROMAZINE HCL 10 MG
25 TABLET ORAL ONCE
Qty: 0 | Refills: 0 | Status: COMPLETED | OUTPATIENT
Start: 2018-10-10 | End: 2018-10-10

## 2018-10-10 RX ORDER — FAMOTIDINE 10 MG/ML
20 INJECTION INTRAVENOUS ONCE
Qty: 0 | Refills: 0 | Status: COMPLETED | OUTPATIENT
Start: 2018-10-10 | End: 2018-10-10

## 2018-10-10 RX ADMIN — FAMOTIDINE 20 MILLIGRAM(S): 10 INJECTION INTRAVENOUS at 18:47

## 2018-10-10 RX ADMIN — Medication 25 MILLIGRAM(S): at 18:59

## 2018-10-10 NOTE — ED ADULT NURSE NOTE - NSIMPLEMENTINTERV_GEN_ALL_ED
Implemented All Universal Safety Interventions:  [place] to call system. Call bell, personal items and telephone within reach. Instruct patient to call for assistance. Room bathroom lighting operational. Non-slip footwear when patient is off stretcher. Physically safe environment: no spills, clutter or unnecessary equipment. Stretcher in lowest position, wheels locked, appropriate side rails in place.

## 2018-10-10 NOTE — ED ADULT TRIAGE NOTE - CHIEF COMPLAINT QUOTE
SOB Hiccups. Was seen in ER yesterday, returning today for same. Patient is hiccuping and states that he has episodes where he feels like he is stuck "mid-hiccup" and can't breathe. Patient states he did not want to come to ER again today but that "My options were to come to the hospital again or to stay home and fight with my wife".

## 2018-10-10 NOTE — ED ADULT NURSE NOTE - OBJECTIVE STATEMENT
82 yr old male has had hiccups for a few days . was seen yesterday and had blood work and cat scan where nothing was found. but now hiccups are still there and more frequent. in assessment a and 0 x 3 lungs clear abd soft non tender no swelling in extremities no n/v/d/ no fevers no other issues.

## 2018-10-10 NOTE — ED PROVIDER NOTE - MEDICAL DECISION MAKING DETAILS
MD Jens,Attending: pt seen. agree with above HPI/ROS/PE.  has had complete workup and CT 2 days ago. NAD. No need for further imaging/labs at this time. for thorazine as it improved sxs 2 days ago--failed to get from pharmacy . PCP followup later this week

## 2018-10-10 NOTE — ED PROVIDER NOTE - OBJECTIVE STATEMENT
c/o hiccups. seen for same recently and had extensive workup including CT chest which showed small effusion. Improved after Thorazine and d/c ed for followup.  from prior recent  visit: 82 year old male with Afib on Xarelto, DM, HTN, presenting with 1 week h/o of hiccups and 2 days of difficulty breathing. Patient states hiccups started without any trigger, and has been constant, worsening, and only water makes it better. Endorses stable PO intake. Also endorsing difficulty breathing for 2 days due to hiccups, but denies any CP, LOC, fevers, abdominal pain or back pain. Denies any weakness or numbness, denies N/V/D.    Has had hiccups previously but denies duration >1 day previously.

## 2018-10-11 PROBLEM — E11.9 TYPE 2 DIABETES MELLITUS WITHOUT COMPLICATIONS: Chronic | Status: ACTIVE | Noted: 2018-10-08

## 2018-10-11 PROBLEM — I10 ESSENTIAL (PRIMARY) HYPERTENSION: Chronic | Status: ACTIVE | Noted: 2018-10-08

## 2018-10-11 PROBLEM — I48.91 UNSPECIFIED ATRIAL FIBRILLATION: Chronic | Status: ACTIVE | Noted: 2018-10-08

## 2018-11-02 ENCOUNTER — APPOINTMENT (OUTPATIENT)
Dept: GASTROENTEROLOGY | Facility: CLINIC | Age: 83
End: 2018-11-02

## 2018-11-02 NOTE — ED ADULT NURSE NOTE - NS PRO PASSIVE SMOKE EXP
Problem: Intellectual/Education/Knowledge Deficit  Goal: Teaching initiated upon admission  Outcome: Completed Date Met: 11/02/18  Discussed reclast and possible side effects. Explained creatinine and to increase water intake today. Pt states understanding.
No

## 2018-11-05 ENCOUNTER — MEDICATION RENEWAL (OUTPATIENT)
Age: 83
End: 2018-11-05

## 2019-01-14 ENCOUNTER — MEDICATION RENEWAL (OUTPATIENT)
Age: 84
End: 2019-01-14

## 2019-01-29 NOTE — ED PROVIDER NOTE - PHYSICAL EXAMINATION
January 29, 2019      Adilene Qureshi PA-C  [address]  [facility] [address]           [facility]  [address]  [facility] [address]  Phone: [phone]          Patient: Eugenio Aldridge   MR Number: [id number]   YOB: 1944   Date of Visit: 1/29/2019       Dear Adilene Qureshi:    Thank you for referring Eugenio Aldridge to me for evaluation. Attached you will find relevant portions of my assessment and plan of care.    If you have questions, please do not hesitate to call me. I look forward to following Eugenio Aldridge along with you.    Sincerely,    Walter Hernandez MD    Enclosure  CC:  No Recipients    If you would like to receive this communication electronically, please contact [email or web address] or [phone] to request more information on Virtual Paper Link access.    For providers and/or their staff who would like to refer a patient to [facility], please contact us through our one-stop-shop provider referral line, [facility], at [phone].    If you feel you have received this communication in error or would no longer like to receive these types of communications, please e-mail [email or web address]         
Gen: AAOx3, non-toxic, occasional hiccups  Head: NCAT  HEENT: EOMI, oral mucosa moist, normal conjunctiva  Lung: CTAB, no respiratory distress,, speaking in full sentences  CV: RRR, no murmurs, rubs or gallops  Abd: soft, NTND, no guarding, no CVA tenderness  MSK: no visible deformities  Neuro: No focal sensory or motor deficits  Psych: normal affect.   ~Bong Donovan M.D. Resident

## 2019-02-05 ENCOUNTER — MEDICATION RENEWAL (OUTPATIENT)
Age: 84
End: 2019-02-05

## 2019-02-07 ENCOUNTER — MEDICATION RENEWAL (OUTPATIENT)
Age: 84
End: 2019-02-07

## 2019-02-07 RX ORDER — LEVOMEFOLATE/B6/B12/ALGAL OIL 3-35-2 MG
3-90.314-2-35 CAPSULE ORAL
Qty: 180 | Refills: 3 | Status: ACTIVE | OUTPATIENT
Start: 2017-01-09

## 2019-02-19 ENCOUNTER — APPOINTMENT (OUTPATIENT)
Dept: CARDIOLOGY | Facility: CLINIC | Age: 84
End: 2019-02-19
Payer: MEDICARE

## 2019-02-19 ENCOUNTER — NON-APPOINTMENT (OUTPATIENT)
Age: 84
End: 2019-02-19

## 2019-02-19 VITALS
OXYGEN SATURATION: 94 % | HEART RATE: 80 BPM | BODY MASS INDEX: 33.75 KG/M2 | WEIGHT: 210 LBS | DIASTOLIC BLOOD PRESSURE: 68 MMHG | HEIGHT: 66 IN | SYSTOLIC BLOOD PRESSURE: 140 MMHG

## 2019-02-19 VITALS — SYSTOLIC BLOOD PRESSURE: 112 MMHG | DIASTOLIC BLOOD PRESSURE: 66 MMHG

## 2019-02-19 DIAGNOSIS — I49.3 VENTRICULAR PREMATURE DEPOLARIZATION: ICD-10-CM

## 2019-02-19 PROCEDURE — 93040 RHYTHM ECG WITH REPORT: CPT | Mod: 59

## 2019-02-19 PROCEDURE — 93000 ELECTROCARDIOGRAM COMPLETE: CPT

## 2019-02-19 PROCEDURE — 99214 OFFICE O/P EST MOD 30 MIN: CPT | Mod: 25

## 2019-02-19 PROCEDURE — 36415 COLL VENOUS BLD VENIPUNCTURE: CPT

## 2019-02-19 PROCEDURE — 93306 TTE W/DOPPLER COMPLETE: CPT

## 2019-02-20 ENCOUNTER — MESSAGE (OUTPATIENT)
Age: 84
End: 2019-02-20

## 2019-02-22 ENCOUNTER — RX RENEWAL (OUTPATIENT)
Age: 84
End: 2019-02-22

## 2019-02-23 ENCOUNTER — MEDICATION RENEWAL (OUTPATIENT)
Age: 84
End: 2019-02-23

## 2019-02-24 LAB
ALBUMIN SERPL ELPH-MCNC: 4.3 G/DL
ALP BLD-CCNC: 48 U/L
ALT SERPL-CCNC: 46 U/L
ANION GAP SERPL CALC-SCNC: 15 MMOL/L
AST SERPL-CCNC: 30 U/L
BILIRUB SERPL-MCNC: 0.4 MG/DL
BUN SERPL-MCNC: 26 MG/DL
CALCIUM SERPL-MCNC: 9.9 MG/DL
CHLORIDE SERPL-SCNC: 100 MMOL/L
CHOLEST SERPL-MCNC: 226 MG/DL
CHOLEST/HDLC SERPL: 7.1 RATIO
CO2 SERPL-SCNC: 26 MMOL/L
CREAT SERPL-MCNC: 1.34 MG/DL
HDLC SERPL-MCNC: 32 MG/DL
LDLC SERPL CALC-MCNC: NORMAL MG/DL
LDLC SERPL DIRECT ASSAY-MCNC: 81 MG/DL
NT-PROBNP SERPL-MCNC: 122 PG/ML
POTASSIUM SERPL-SCNC: 4.1 MMOL/L
PROT SERPL-MCNC: 7 G/DL
SODIUM SERPL-SCNC: 141 MMOL/L
TRIGL SERPL-MCNC: 748 MG/DL

## 2019-02-25 ENCOUNTER — FORM ENCOUNTER (OUTPATIENT)
Age: 84
End: 2019-02-25

## 2019-02-26 ENCOUNTER — APPOINTMENT (OUTPATIENT)
Age: 84
End: 2019-02-26
Payer: MEDICARE

## 2019-02-26 ENCOUNTER — OUTPATIENT (OUTPATIENT)
Dept: OUTPATIENT SERVICES | Facility: HOSPITAL | Age: 84
LOS: 1 days | End: 2019-02-26
Payer: MEDICARE

## 2019-02-26 DIAGNOSIS — Z00.8 ENCOUNTER FOR OTHER GENERAL EXAMINATION: ICD-10-CM

## 2019-02-26 DIAGNOSIS — C61 MALIGNANT NEOPLASM OF PROSTATE: Chronic | ICD-10-CM

## 2019-02-26 PROCEDURE — 75565 CARD MRI VELOC FLOW MAPPING: CPT

## 2019-02-26 PROCEDURE — 75565 CARD MRI VELOC FLOW MAPPING: CPT | Mod: 26

## 2019-02-26 PROCEDURE — 75561 CARDIAC MRI FOR MORPH W/DYE: CPT | Mod: 26

## 2019-02-26 PROCEDURE — A9585: CPT

## 2019-02-26 PROCEDURE — 75561 CARDIAC MRI FOR MORPH W/DYE: CPT

## 2019-02-28 ENCOUNTER — APPOINTMENT (OUTPATIENT)
Dept: CARDIOLOGY | Facility: CLINIC | Age: 84
End: 2019-02-28
Payer: MEDICARE

## 2019-02-28 PROCEDURE — 93015 CV STRESS TEST SUPVJ I&R: CPT

## 2019-02-28 PROCEDURE — 78452 HT MUSCLE IMAGE SPECT MULT: CPT

## 2019-02-28 PROCEDURE — A9500: CPT

## 2019-03-04 ENCOUNTER — NON-APPOINTMENT (OUTPATIENT)
Age: 84
End: 2019-03-04

## 2019-03-04 DIAGNOSIS — M25.511 PAIN IN RIGHT SHOULDER: ICD-10-CM

## 2019-03-08 ENCOUNTER — MESSAGE (OUTPATIENT)
Age: 84
End: 2019-03-08

## 2019-03-09 RX ORDER — SACUBITRIL AND VALSARTAN 49; 51 MG/1; MG/1
49-51 TABLET, FILM COATED ORAL TWICE DAILY
Qty: 60 | Refills: 11 | Status: ACTIVE | COMMUNITY
Start: 2019-03-09 | End: 1900-01-01

## 2019-03-18 ENCOUNTER — FORM ENCOUNTER (OUTPATIENT)
Age: 84
End: 2019-03-18

## 2019-03-18 PROBLEM — M25.511 ACUTE PAIN OF RIGHT SHOULDER: Status: ACTIVE | Noted: 2019-03-18

## 2019-03-19 ENCOUNTER — MEDICATION RENEWAL (OUTPATIENT)
Age: 84
End: 2019-03-19

## 2019-03-19 ENCOUNTER — APPOINTMENT (OUTPATIENT)
Dept: RADIOLOGY | Facility: CLINIC | Age: 84
End: 2019-03-19
Payer: MEDICARE

## 2019-03-19 ENCOUNTER — OUTPATIENT (OUTPATIENT)
Dept: OUTPATIENT SERVICES | Facility: HOSPITAL | Age: 84
LOS: 1 days | End: 2019-03-19
Payer: MEDICARE

## 2019-03-19 DIAGNOSIS — C61 MALIGNANT NEOPLASM OF PROSTATE: Chronic | ICD-10-CM

## 2019-03-19 DIAGNOSIS — M25.511 PAIN IN RIGHT SHOULDER: ICD-10-CM

## 2019-03-19 PROCEDURE — 73030 X-RAY EXAM OF SHOULDER: CPT

## 2019-03-19 PROCEDURE — 73030 X-RAY EXAM OF SHOULDER: CPT | Mod: 26,RT

## 2019-04-16 ENCOUNTER — MEDICATION RENEWAL (OUTPATIENT)
Age: 84
End: 2019-04-16

## 2019-05-01 ENCOUNTER — APPOINTMENT (OUTPATIENT)
Dept: INTERNAL MEDICINE | Facility: CLINIC | Age: 84
End: 2019-05-01
Payer: MEDICARE

## 2019-05-01 VITALS
TEMPERATURE: 98.7 F | HEIGHT: 66 IN | DIASTOLIC BLOOD PRESSURE: 62 MMHG | BODY MASS INDEX: 33.27 KG/M2 | WEIGHT: 207 LBS | SYSTOLIC BLOOD PRESSURE: 109 MMHG

## 2019-05-01 LAB — HBA1C MFR BLD HPLC: 7

## 2019-05-01 PROCEDURE — 81002 URINALYSIS NONAUTO W/O SCOPE: CPT

## 2019-05-01 PROCEDURE — 80047 BASIC METABLC PNL IONIZED CA: CPT | Mod: QW

## 2019-05-01 PROCEDURE — 83036 HEMOGLOBIN GLYCOSYLATED A1C: CPT | Mod: QW

## 2019-05-01 PROCEDURE — 36415 COLL VENOUS BLD VENIPUNCTURE: CPT

## 2019-05-01 PROCEDURE — 99214 OFFICE O/P EST MOD 30 MIN: CPT | Mod: 25

## 2019-05-02 ENCOUNTER — APPOINTMENT (OUTPATIENT)
Dept: ULTRASOUND IMAGING | Facility: CLINIC | Age: 84
End: 2019-05-02

## 2019-05-03 ENCOUNTER — APPOINTMENT (OUTPATIENT)
Dept: INTERNAL MEDICINE | Facility: CLINIC | Age: 84
End: 2019-05-03
Payer: MEDICARE

## 2019-05-03 VITALS
SYSTOLIC BLOOD PRESSURE: 118 MMHG | WEIGHT: 206 LBS | BODY MASS INDEX: 33.11 KG/M2 | TEMPERATURE: 97.8 F | HEIGHT: 66 IN | DIASTOLIC BLOOD PRESSURE: 73 MMHG

## 2019-05-03 DIAGNOSIS — R80.9 PROTEINURIA, UNSPECIFIED: ICD-10-CM

## 2019-05-03 LAB
ALBUMIN SERPL ELPH-MCNC: 4.3 G/DL
ALP BLD-CCNC: 55 U/L
ALT SERPL-CCNC: 45 U/L
ANION GAP SERPL CALC-SCNC: 14 MMOL/L
AST SERPL-CCNC: 30 U/L
BASOPHILS # BLD AUTO: 0.06 K/UL
BASOPHILS NFR BLD AUTO: 0.8 %
BILIRUB SERPL-MCNC: 0.4 MG/DL
BILIRUB UR QL STRIP: NEGATIVE
BUN SERPL-MCNC: 20
BUN SERPL-MCNC: 21 MG/DL
CA-I SERPL-SCNC: 1.2
CALCIUM SERPL-MCNC: 10.1 MG/DL
CHLORIDE SERPL-SCNC: 100
CHLORIDE SERPL-SCNC: 99 MMOL/L
CO2 BLDA-SCNC: 27
CO2 SERPL-SCNC: 25 MMOL/L
CREAT SERPL-MCNC: 1.19 MG/DL
CREAT SERPL-MCNC: 1.2
EOSINOPHIL # BLD AUTO: 0.14 K/UL
EOSINOPHIL NFR BLD AUTO: 2 %
ESTIMATED AVERAGE GLUCOSE: 157 MG/DL
GLUCOSE SERPL-MCNC: 213
GLUCOSE SERPL-MCNC: 214 MG/DL
GLUCOSE UR-MCNC: NEGATIVE
HBA1C MFR BLD HPLC: 7.1 %
HCG UR QL: 0.2 EU/DL
HCT VFR BLD CALC: 42.9 %
HGB BLD-MCNC: 14.5 G/DL
HGB UR QL STRIP.AUTO: ABNORMAL
IMM GRANULOCYTES NFR BLD AUTO: 0.4 %
KETONES UR-MCNC: NEGATIVE
LEUKOCYTE ESTERASE UR QL STRIP: NEGATIVE
LYMPHOCYTES # BLD AUTO: 1.46 K/UL
LYMPHOCYTES NFR BLD AUTO: 20.6 %
MAN DIFF?: NORMAL
MCHC RBC-ENTMCNC: 30.9 PG
MCHC RBC-ENTMCNC: 33.8 GM/DL
MCV RBC AUTO: 91.5 FL
MONOCYTES # BLD AUTO: 0.5 K/UL
MONOCYTES NFR BLD AUTO: 7.1 %
NEUTROPHILS # BLD AUTO: 4.9 K/UL
NEUTROPHILS NFR BLD AUTO: 69.1 %
NITRITE UR QL STRIP: NEGATIVE
PH UR STRIP: 6
PLATELET # BLD AUTO: 253 K/UL
POTASSIUM SERPL-SCNC: 3.7
POTASSIUM SERPL-SCNC: 3.8 MMOL/L
PROT SERPL-MCNC: 7.3 G/DL
PROT UR STRIP-MCNC: ABNORMAL
RBC # BLD: 4.69 M/UL
RBC # FLD: 13.9 %
SAVE SPECIMEN: NORMAL
SODIUM SERPL-SCNC: 138 MMOL/L
SODIUM SERPL-SCNC: 140
SP GR UR STRIP: 1.03
WBC # FLD AUTO: 7.09 K/UL

## 2019-05-03 PROCEDURE — 81002 URINALYSIS NONAUTO W/O SCOPE: CPT

## 2019-05-03 PROCEDURE — 99213 OFFICE O/P EST LOW 20 MIN: CPT | Mod: 25

## 2019-05-06 ENCOUNTER — FORM ENCOUNTER (OUTPATIENT)
Age: 84
End: 2019-05-06

## 2019-05-06 ENCOUNTER — RESULT CHARGE (OUTPATIENT)
Age: 84
End: 2019-05-06

## 2019-05-06 LAB
BACTERIA UR CULT: NORMAL
BILIRUB UR QL STRIP: ABNORMAL
GLUCOSE UR-MCNC: NEGATIVE
HCG UR QL: 0.2 EU/DL
HGB UR QL STRIP.AUTO: ABNORMAL
KETONES UR-MCNC: ABNORMAL
LEUKOCYTE ESTERASE UR QL STRIP: NEGATIVE
NITRITE UR QL STRIP: NEGATIVE
PH UR STRIP: 6
PROT UR STRIP-MCNC: ABNORMAL
SP GR UR STRIP: 1.03

## 2019-05-07 ENCOUNTER — APPOINTMENT (OUTPATIENT)
Dept: ULTRASOUND IMAGING | Facility: CLINIC | Age: 84
End: 2019-05-07
Payer: MEDICARE

## 2019-05-07 ENCOUNTER — OUTPATIENT (OUTPATIENT)
Dept: OUTPATIENT SERVICES | Facility: HOSPITAL | Age: 84
LOS: 1 days | End: 2019-05-07
Payer: MEDICARE

## 2019-05-07 DIAGNOSIS — C61 MALIGNANT NEOPLASM OF PROSTATE: Chronic | ICD-10-CM

## 2019-05-07 DIAGNOSIS — Z00.8 ENCOUNTER FOR OTHER GENERAL EXAMINATION: ICD-10-CM

## 2019-05-07 PROCEDURE — 76700 US EXAM ABDOM COMPLETE: CPT | Mod: 26

## 2019-05-07 PROCEDURE — 76700 US EXAM ABDOM COMPLETE: CPT

## 2019-05-09 ENCOUNTER — MEDICATION RENEWAL (OUTPATIENT)
Age: 84
End: 2019-05-09

## 2019-05-15 ENCOUNTER — MESSAGE (OUTPATIENT)
Age: 84
End: 2019-05-15

## 2019-05-16 ENCOUNTER — MEDICATION RENEWAL (OUTPATIENT)
Age: 84
End: 2019-05-16

## 2019-05-28 ENCOUNTER — APPOINTMENT (OUTPATIENT)
Dept: INTERNAL MEDICINE | Facility: CLINIC | Age: 84
End: 2019-05-28
Payer: MEDICARE

## 2019-05-28 ENCOUNTER — LABORATORY RESULT (OUTPATIENT)
Age: 84
End: 2019-05-28

## 2019-05-28 VITALS
TEMPERATURE: 97.4 F | HEIGHT: 66 IN | BODY MASS INDEX: 32.78 KG/M2 | WEIGHT: 204 LBS | DIASTOLIC BLOOD PRESSURE: 70 MMHG | SYSTOLIC BLOOD PRESSURE: 128 MMHG

## 2019-05-28 VITALS — OXYGEN SATURATION: 93 %

## 2019-05-28 DIAGNOSIS — R11.2 NAUSEA WITH VOMITING, UNSPECIFIED: ICD-10-CM

## 2019-05-28 PROCEDURE — 36415 COLL VENOUS BLD VENIPUNCTURE: CPT

## 2019-05-28 PROCEDURE — 80047 BASIC METABLC PNL IONIZED CA: CPT | Mod: QW

## 2019-05-28 PROCEDURE — 99214 OFFICE O/P EST MOD 30 MIN: CPT | Mod: 25

## 2019-05-29 ENCOUNTER — LABORATORY RESULT (OUTPATIENT)
Age: 84
End: 2019-05-29

## 2019-05-29 ENCOUNTER — APPOINTMENT (OUTPATIENT)
Dept: INTERNAL MEDICINE | Facility: CLINIC | Age: 84
End: 2019-05-29
Payer: MEDICARE

## 2019-05-29 VITALS
BODY MASS INDEX: 32.93 KG/M2 | SYSTOLIC BLOOD PRESSURE: 129 MMHG | WEIGHT: 204 LBS | DIASTOLIC BLOOD PRESSURE: 74 MMHG | TEMPERATURE: 98.1 F

## 2019-05-29 DIAGNOSIS — Z09 ENCOUNTER FOR FOLLOW-UP EXAMINATION AFTER COMPLETED TREATMENT FOR CONDITIONS OTHER THAN MALIGNANT NEOPLASM: ICD-10-CM

## 2019-05-29 LAB
ALBUMIN SERPL ELPH-MCNC: 4.1 G/DL
ALP BLD-CCNC: 62 U/L
ALT SERPL-CCNC: 48 U/L
AMYLASE/CREAT SERPL: 34 U/L
ANION GAP SERPL CALC-SCNC: 15 MMOL/L
AST SERPL-CCNC: 58 U/L
BASOPHILS # BLD AUTO: 0.04 K/UL
BASOPHILS NFR BLD AUTO: 0.5 %
BILIRUB SERPL-MCNC: 0.5 MG/DL
BUN SERPL-MCNC: 18 MG/DL
BUN SERPL-MCNC: 19
CA-I SERPL-SCNC: 1.21
CALCIUM SERPL-MCNC: 9.8 MG/DL
CHLORIDE SERPL-SCNC: 98
CHLORIDE SERPL-SCNC: 98 MMOL/L
CO2 BLDA-SCNC: 27
CO2 SERPL-SCNC: 25 MMOL/L
CREAT SERPL-MCNC: 1.13 MG/DL
CREAT SERPL-MCNC: 1.2
EOSINOPHIL # BLD AUTO: 0.06 K/UL
EOSINOPHIL NFR BLD AUTO: 0.8 %
GLUCOSE SERPL-MCNC: 254
GLUCOSE SERPL-MCNC: 262 MG/DL
HCT VFR BLD CALC: 43 %
HGB BLD-MCNC: 14.6 G/DL
IMM GRANULOCYTES NFR BLD AUTO: 0.5 %
LPL SERPL-CCNC: 20 U/L
LYMPHOCYTES # BLD AUTO: 1.28 K/UL
LYMPHOCYTES NFR BLD AUTO: 17.2 %
MAN DIFF?: NORMAL
MCHC RBC-ENTMCNC: 30.4 PG
MCHC RBC-ENTMCNC: 34 GM/DL
MCV RBC AUTO: 89.4 FL
MONOCYTES # BLD AUTO: 0.74 K/UL
MONOCYTES NFR BLD AUTO: 9.9 %
NEUTROPHILS # BLD AUTO: 5.29 K/UL
NEUTROPHILS NFR BLD AUTO: 71.1 %
PLATELET # BLD AUTO: 247 K/UL
POTASSIUM SERPL-SCNC: 3.3
POTASSIUM SERPL-SCNC: 3.5 MMOL/L
PROT SERPL-MCNC: 7.4 G/DL
RBC # BLD: 4.81 M/UL
RBC # FLD: 14.5 %
SAVE SPECIMEN: NORMAL
SODIUM SERPL-SCNC: 138 MMOL/L
SODIUM SERPL-SCNC: 139
WBC # FLD AUTO: 7.45 K/UL

## 2019-05-29 PROCEDURE — 99213 OFFICE O/P EST LOW 20 MIN: CPT

## 2019-05-29 RX ORDER — BISMUTH SUBSALICYLATE 525 MG/1
TABLET ORAL
Qty: 30 | Refills: 0 | Status: ACTIVE | COMMUNITY
Start: 2019-05-29 | End: 1900-01-01

## 2019-06-06 ENCOUNTER — APPOINTMENT (OUTPATIENT)
Dept: CARDIOLOGY | Facility: CLINIC | Age: 84
End: 2019-06-06
Payer: MEDICARE

## 2019-06-06 ENCOUNTER — NON-APPOINTMENT (OUTPATIENT)
Age: 84
End: 2019-06-06

## 2019-06-06 VITALS — SYSTOLIC BLOOD PRESSURE: 126 MMHG | DIASTOLIC BLOOD PRESSURE: 62 MMHG

## 2019-06-06 VITALS
SYSTOLIC BLOOD PRESSURE: 148 MMHG | HEART RATE: 66 BPM | BODY MASS INDEX: 32.93 KG/M2 | OXYGEN SATURATION: 97 % | DIASTOLIC BLOOD PRESSURE: 80 MMHG | WEIGHT: 204 LBS

## 2019-06-06 DIAGNOSIS — I25.10 ATHEROSCLEROTIC HEART DISEASE OF NATIVE CORONARY ARTERY W/OUT ANGINA PECTORIS: ICD-10-CM

## 2019-06-06 DIAGNOSIS — R05 COUGH: ICD-10-CM

## 2019-06-06 PROCEDURE — 93040 RHYTHM ECG WITH REPORT: CPT | Mod: 59

## 2019-06-06 PROCEDURE — 93000 ELECTROCARDIOGRAM COMPLETE: CPT

## 2019-06-06 PROCEDURE — 99214 OFFICE O/P EST MOD 30 MIN: CPT

## 2019-06-08 NOTE — HISTORY OF PRESENT ILLNESS
[FreeTextEntry1] : Cough/cold X 3 weeks.\par Never started Entresto (denied).\par He denies chest pain, shortness of breath, and palpitations.\par

## 2019-06-12 ENCOUNTER — NON-APPOINTMENT (OUTPATIENT)
Age: 84
End: 2019-06-12

## 2019-06-27 ENCOUNTER — NON-APPOINTMENT (OUTPATIENT)
Age: 84
End: 2019-06-27

## 2019-07-01 ENCOUNTER — APPOINTMENT (OUTPATIENT)
Dept: INTERNAL MEDICINE | Facility: CLINIC | Age: 84
End: 2019-07-01
Payer: MEDICARE

## 2019-07-01 VITALS
SYSTOLIC BLOOD PRESSURE: 138 MMHG | TEMPERATURE: 97.6 F | DIASTOLIC BLOOD PRESSURE: 70 MMHG | BODY MASS INDEX: 32.78 KG/M2 | HEART RATE: 87 BPM | WEIGHT: 204 LBS | HEIGHT: 66 IN | OXYGEN SATURATION: 96 %

## 2019-07-01 DIAGNOSIS — Z02.4 ENCOUNTER FOR EXAMINATION FOR DRIVING LICENSE: ICD-10-CM

## 2019-07-01 PROCEDURE — 99213 OFFICE O/P EST LOW 20 MIN: CPT

## 2019-07-17 ENCOUNTER — MEDICATION RENEWAL (OUTPATIENT)
Age: 84
End: 2019-07-17

## 2019-07-25 ENCOUNTER — MEDICATION RENEWAL (OUTPATIENT)
Age: 84
End: 2019-07-25

## 2019-07-30 RX ORDER — ZOLPIDEM TARTRATE 10 MG/1
10 TABLET ORAL
Qty: 30 | Refills: 2 | Status: ACTIVE | COMMUNITY
Start: 2019-01-14

## 2019-08-12 ENCOUNTER — LABORATORY RESULT (OUTPATIENT)
Age: 84
End: 2019-08-12

## 2019-08-12 ENCOUNTER — APPOINTMENT (OUTPATIENT)
Dept: INTERNAL MEDICINE | Facility: CLINIC | Age: 84
End: 2019-08-12
Payer: MEDICARE

## 2019-08-12 PROCEDURE — 36415 COLL VENOUS BLD VENIPUNCTURE: CPT

## 2019-08-19 ENCOUNTER — APPOINTMENT (OUTPATIENT)
Dept: INTERNAL MEDICINE | Facility: CLINIC | Age: 84
End: 2019-08-19
Payer: MEDICARE

## 2019-08-19 VITALS — DIASTOLIC BLOOD PRESSURE: 68 MMHG | SYSTOLIC BLOOD PRESSURE: 150 MMHG

## 2019-08-19 VITALS
WEIGHT: 204 LBS | HEIGHT: 66 IN | OXYGEN SATURATION: 97 % | SYSTOLIC BLOOD PRESSURE: 160 MMHG | DIASTOLIC BLOOD PRESSURE: 70 MMHG | HEART RATE: 82 BPM | BODY MASS INDEX: 32.78 KG/M2 | TEMPERATURE: 97.9 F

## 2019-08-19 DIAGNOSIS — M10.9 GOUT, UNSPECIFIED: ICD-10-CM

## 2019-08-19 DIAGNOSIS — I10 ESSENTIAL (PRIMARY) HYPERTENSION: ICD-10-CM

## 2019-08-19 DIAGNOSIS — I42.9 CARDIOMYOPATHY, UNSPECIFIED: ICD-10-CM

## 2019-08-19 DIAGNOSIS — Q12.0 CONGENITAL CATARACT: ICD-10-CM

## 2019-08-19 DIAGNOSIS — M17.10 UNILATERAL PRIMARY OSTEOARTHRITIS, UNSPECIFIED KNEE: ICD-10-CM

## 2019-08-19 DIAGNOSIS — E13.42 OTHER SPECIFIED DIABETES MELLITUS WITH DIABETIC POLYNEUROPATHY: ICD-10-CM

## 2019-08-19 DIAGNOSIS — E78.00 PURE HYPERCHOLESTEROLEMIA, UNSPECIFIED: ICD-10-CM

## 2019-08-19 DIAGNOSIS — R06.09 OTHER FORMS OF DYSPNEA: ICD-10-CM

## 2019-08-19 DIAGNOSIS — C61 MALIGNANT NEOPLASM OF PROSTATE: ICD-10-CM

## 2019-08-19 DIAGNOSIS — E11.8 TYPE 2 DIABETES MELLITUS WITH UNSPECIFIED COMPLICATIONS: ICD-10-CM

## 2019-08-19 DIAGNOSIS — I48.92 UNSPECIFIED ATRIAL FLUTTER: ICD-10-CM

## 2019-08-19 PROCEDURE — 36415 COLL VENOUS BLD VENIPUNCTURE: CPT

## 2019-08-19 PROCEDURE — 94010 BREATHING CAPACITY TEST: CPT

## 2019-08-19 PROCEDURE — 99215 OFFICE O/P EST HI 40 MIN: CPT | Mod: 25

## 2019-08-19 PROCEDURE — 93000 ELECTROCARDIOGRAM COMPLETE: CPT

## 2019-08-19 PROCEDURE — G0439: CPT | Mod: 25

## 2019-08-19 RX ORDER — CIPROFLOXACIN HYDROCHLORIDE 500 MG/1
500 TABLET, FILM COATED ORAL
Qty: 20 | Refills: 0 | Status: DISCONTINUED | COMMUNITY
Start: 2019-05-29 | End: 2019-08-19

## 2019-08-19 RX ORDER — METOPROLOL SUCCINATE 25 MG/1
25 TABLET, EXTENDED RELEASE ORAL
Qty: 90 | Refills: 3 | Status: ACTIVE | COMMUNITY
Start: 1900-01-01 | End: 1900-01-01

## 2019-08-19 RX ORDER — OXYBUTYNIN CHLORIDE 5 MG/1
5 TABLET ORAL
Qty: 90 | Refills: 3 | Status: ACTIVE | COMMUNITY
Start: 2019-08-19 | End: 1900-01-01

## 2019-08-19 NOTE — HISTORY OF PRESENT ILLNESS
[FreeTextEntry1] : Complete annual examination [de-identified] : Feels well. The patient is taking approximately 20 different medications and appears somewhat confused about what he is taking or what indication. They are all reviewed.

## 2019-08-19 NOTE — HEALTH RISK ASSESSMENT
[Good] : ~his/her~  mood as  good [] : Yes [No] : No [21-25] : 21-25 [Never (0 pts)] : Never (0 points) [No falls in past year] : Patient reported no falls in the past year [0] : 2) Feeling down, depressed, or hopeless: Not at all (0) [Patient reported colonoscopy was abnormal] : Patient reported colonoscopy was abnormal [Hepatitis C test declined] : Hepatitis C test declined [HIV test declined] : HIV test declined [None] : None [With Family] : lives with family [Retired] : retired [] :  [Feels Safe at Home] : Feels safe at home [Fully functional (bathing, dressing, toileting, transferring, walking, feeding)] : Fully functional (bathing, dressing, toileting, transferring, walking, feeding) [Fully functional (using the telephone, shopping, preparing meals, housekeeping, doing laundry, using] : Fully functional and needs no help or supervision to perform IADLs (using the telephone, shopping, preparing meals, housekeeping, doing laundry, using transportation, managing medications and managing finances) [Reports changes in vision] : Reports changes in vision [Reports normal functional visual acuity (ie: able to read med bottle)] : Reports normal functional visual acuity [Smoke Detector] : smoke detector [Carbon Monoxide Detector] : carbon monoxide detector [Seat Belt] :  uses seat belt [Sunscreen] : uses sunscreen [Patient/Caregiver not ready to engage] : Patient/Caregiver not ready to engage [FreeTextEntry1] : General health. Activity is severely limited by arthritic knees [de-identified] : Cardiology [YearQuit] : 1980 [de-identified] : See present  Gets around Very slowly [de-identified] : generally healthy [CCJ2Ptmkn] : 0 [Change in mental status noted] : No change in mental status noted [Language] : denies difficulty with language [Behavior] : denies difficulty with behavior [Learning/Retaining New Information] : denies difficulty learning/retaining new information [Handling Complex Tasks] : denies difficulty handling complex tasks [Reasoning] : denies difficulty with reasoning [Spatial Ability and Orientation] : denies difficulty with spatial ability and orientation [Sexually Active] : not sexually active [High Risk Behavior] : no high risk behavior [Reports changes in hearing] : Reports no changes in hearing [Reports changes in dental health] : Reports no changes in dental health [Guns at Home] : no guns at home [Travel to Developing Areas] : does not  travel to developing areas [Caregiver Concerns] : does not have caregiver concerns [TB Exposure] : is not being exposed to tuberculosis [ColonoscopyDate] : 9/16 [ColonoscopyComments] : Severe diverticulosis, recheck 5-10 years [de-identified] : Walks slowly [AdvancecareDate] : 9/19

## 2019-08-19 NOTE — REVIEW OF SYSTEMS
[Diarrhea] : diarrhea [Hesitancy] : hesitancy [Nocturia] : nocturia [Joint Stiffness] : joint stiffness [Joint Pain] : joint pain [Joint Swelling] : joint swelling [Negative] : Psychiatric [Shortness Of Breath] : no shortness of breath [Wheezing] : no wheezing [Cough] : no cough [Dyspnea on Exertion] : not dyspnea on exertion [Abdominal Pain] : no abdominal pain [Constipation] : no constipation [Nausea] : no nausea [Vomiting] : no vomiting [Melena] : no melena [Heartburn] : no heartburn [FreeTextEntry8] : No change [FreeTextEntry7] : See present illness [FreeTextEntry9] : Bilateral knee pain

## 2019-08-19 NOTE — DATA REVIEWED
[FreeTextEntry1] : Electrocardiogram reveals a sinus rhythm with first degree AV block, left axis deviation and a left bundle branch block Q waves in V3 and 4-no significant change. Spirometry is normal.\par \par Blood work is drawn, results pending.

## 2019-08-29 ENCOUNTER — MEDICATION RENEWAL (OUTPATIENT)
Age: 84
End: 2019-08-29

## 2019-08-30 ENCOUNTER — MEDICATION RENEWAL (OUTPATIENT)
Age: 84
End: 2019-08-30

## 2019-08-30 RX ORDER — ATORVASTATIN CALCIUM 20 MG/1
20 TABLET, FILM COATED ORAL DAILY
Qty: 90 | Refills: 1 | Status: ACTIVE | COMMUNITY
Start: 2019-02-19 | End: 1900-01-01

## 2019-09-02 PROBLEM — Z09 FOLLOW UP: Status: ACTIVE | Noted: 2019-05-03

## 2019-09-16 ENCOUNTER — MEDICATION RENEWAL (OUTPATIENT)
Age: 84
End: 2019-09-16

## 2019-10-03 ENCOUNTER — RX RENEWAL (OUTPATIENT)
Age: 84
End: 2019-10-03

## 2019-10-03 RX ORDER — METFORMIN ER 500 MG 500 MG/1
500 TABLET ORAL
Qty: 90 | Refills: 3 | Status: ACTIVE | COMMUNITY
Start: 2018-09-07 | End: 1900-01-01

## 2019-10-16 ENCOUNTER — APPOINTMENT (OUTPATIENT)
Dept: INTERNAL MEDICINE | Facility: CLINIC | Age: 84
End: 2019-10-16
Payer: MEDICARE

## 2019-10-16 VITALS
HEIGHT: 66 IN | BODY MASS INDEX: 32.3 KG/M2 | WEIGHT: 201 LBS | DIASTOLIC BLOOD PRESSURE: 70 MMHG | TEMPERATURE: 98.5 F | SYSTOLIC BLOOD PRESSURE: 150 MMHG

## 2019-10-16 DIAGNOSIS — R10.32 LEFT LOWER QUADRANT PAIN: ICD-10-CM

## 2019-10-16 DIAGNOSIS — R19.7 DIARRHEA, UNSPECIFIED: ICD-10-CM

## 2019-10-16 DIAGNOSIS — R19.5 OTHER FECAL ABNORMALITIES: ICD-10-CM

## 2019-10-16 LAB
BUN SERPL-MCNC: 19
CA-I SERPL-SCNC: 1.25
CHLORIDE SERPL-SCNC: 99
CO2 BLDA-SCNC: 27
CREAT SERPL-MCNC: 1.2
GLUCOSE SERPL-MCNC: 161
HEMOGLOBIN: 19
POTASSIUM SERPL-SCNC: 3.2
SODIUM SERPL-SCNC: 140

## 2019-10-16 PROCEDURE — 80047 BASIC METABLC PNL IONIZED CA: CPT | Mod: QW

## 2019-10-16 PROCEDURE — 99214 OFFICE O/P EST MOD 30 MIN: CPT | Mod: 25

## 2019-10-16 PROCEDURE — 36415 COLL VENOUS BLD VENIPUNCTURE: CPT

## 2019-10-16 NOTE — PHYSICAL EXAM
[No Acute Distress] : no acute distress [Well Nourished] : well nourished [Well Developed] : well developed [Soft] : abdomen soft [Non-distended] : non-distended [de-identified] : LLQ discomfort

## 2019-10-16 NOTE — HISTORY OF PRESENT ILLNESS
[FreeTextEntry8] : c/o loose stool 4 times daily and LLQ discomfort since 2 weeks. No fever,chills. \par Colonoscopy was 2016- diverticulosis. Denies diverticulitis.

## 2019-10-19 LAB
ALBUMIN SERPL ELPH-MCNC: 4.2 G/DL
ALP BLD-CCNC: 59 U/L
ALT SERPL-CCNC: 46 U/L
ANION GAP SERPL CALC-SCNC: 19 MMOL/L
AST SERPL-CCNC: 35 U/L
BASOPHILS # BLD AUTO: 0.08 K/UL
BASOPHILS NFR BLD AUTO: 1.1 %
BILIRUB SERPL-MCNC: 0.8 MG/DL
BUN SERPL-MCNC: 17 MG/DL
CALCIUM SERPL-MCNC: 9.9 MG/DL
CHLORIDE SERPL-SCNC: 101 MMOL/L
CO2 SERPL-SCNC: 25 MMOL/L
CREAT SERPL-MCNC: 1.23 MG/DL
EOSINOPHIL # BLD AUTO: 0.15 K/UL
EOSINOPHIL NFR BLD AUTO: 2 %
ERYTHROCYTE [SEDIMENTATION RATE] IN BLOOD BY WESTERGREN METHOD: 8 MM/HR
GLUCOSE SERPL-MCNC: 164 MG/DL
HCT VFR BLD CALC: 44.5 %
HGB BLD-MCNC: 15.5 G/DL
IMM GRANULOCYTES NFR BLD AUTO: 0.4 %
LYMPHOCYTES # BLD AUTO: 1.62 K/UL
LYMPHOCYTES NFR BLD AUTO: 22.1 %
MAN DIFF?: NORMAL
MCHC RBC-ENTMCNC: 31.6 PG
MCHC RBC-ENTMCNC: 34.8 GM/DL
MCV RBC AUTO: 90.6 FL
MONOCYTES # BLD AUTO: 0.7 K/UL
MONOCYTES NFR BLD AUTO: 9.6 %
NEUTROPHILS # BLD AUTO: 4.74 K/UL
NEUTROPHILS NFR BLD AUTO: 64.8 %
PLATELET # BLD AUTO: 281 K/UL
POTASSIUM SERPL-SCNC: 3.5 MMOL/L
PROT SERPL-MCNC: 7.6 G/DL
RBC # BLD: 4.91 M/UL
RBC # FLD: 14.6 %
SAVE SPECIMEN: NORMAL
SODIUM SERPL-SCNC: 145 MMOL/L
WBC # FLD AUTO: 7.32 K/UL

## 2019-11-13 ENCOUNTER — INPATIENT (INPATIENT)
Facility: HOSPITAL | Age: 84
LOS: 0 days | DRG: 64 | End: 2019-11-14
Attending: PSYCHIATRY & NEUROLOGY | Admitting: PSYCHIATRY & NEUROLOGY
Payer: MEDICARE

## 2019-11-13 VITALS
RESPIRATION RATE: 30 BRPM | TEMPERATURE: 102 F | HEART RATE: 95 BPM | DIASTOLIC BLOOD PRESSURE: 80 MMHG | SYSTOLIC BLOOD PRESSURE: 158 MMHG | OXYGEN SATURATION: 100 %

## 2019-11-13 DIAGNOSIS — Z51.5 ENCOUNTER FOR PALLIATIVE CARE: ICD-10-CM

## 2019-11-13 DIAGNOSIS — Z71.89 OTHER SPECIFIED COUNSELING: ICD-10-CM

## 2019-11-13 DIAGNOSIS — G93.40 ENCEPHALOPATHY, UNSPECIFIED: ICD-10-CM

## 2019-11-13 DIAGNOSIS — R53.2 FUNCTIONAL QUADRIPLEGIA: ICD-10-CM

## 2019-11-13 DIAGNOSIS — C61 MALIGNANT NEOPLASM OF PROSTATE: Chronic | ICD-10-CM

## 2019-11-13 DIAGNOSIS — S06.360A TRAUMATIC HEMORRHAGE OF CEREBRUM, UNSPECIFIED, WITHOUT LOSS OF CONSCIOUSNESS, INITIAL ENCOUNTER: ICD-10-CM

## 2019-11-13 DIAGNOSIS — I61.9 NONTRAUMATIC INTRACEREBRAL HEMORRHAGE, UNSPECIFIED: ICD-10-CM

## 2019-11-13 DIAGNOSIS — J96.00 ACUTE RESPIRATORY FAILURE, UNSPECIFIED WHETHER WITH HYPOXIA OR HYPERCAPNIA: ICD-10-CM

## 2019-11-13 LAB
ALBUMIN SERPL ELPH-MCNC: 3.8 G/DL — SIGNIFICANT CHANGE UP (ref 3.3–5)
ALP SERPL-CCNC: 67 U/L — SIGNIFICANT CHANGE UP (ref 40–120)
ALT FLD-CCNC: 47 U/L — HIGH (ref 10–45)
ANION GAP SERPL CALC-SCNC: 28 MMOL/L — HIGH (ref 5–17)
APTT BLD: 23.6 SEC — LOW (ref 27.5–36.3)
AST SERPL-CCNC: 46 U/L — HIGH (ref 10–40)
BILIRUB DIRECT SERPL-MCNC: 0.2 MG/DL — SIGNIFICANT CHANGE UP (ref 0–0.2)
BILIRUB INDIRECT FLD-MCNC: 0.5 MG/DL — SIGNIFICANT CHANGE UP (ref 0.2–1)
BILIRUB SERPL-MCNC: 0.7 MG/DL — SIGNIFICANT CHANGE UP (ref 0.2–1.2)
BLD GP AB SCN SERPL QL: NEGATIVE — SIGNIFICANT CHANGE UP
BUN SERPL-MCNC: 22 MG/DL — SIGNIFICANT CHANGE UP (ref 7–23)
CALCIUM SERPL-MCNC: 9.6 MG/DL — SIGNIFICANT CHANGE UP (ref 8.4–10.5)
CHLORIDE SERPL-SCNC: 95 MMOL/L — LOW (ref 96–108)
CHOLEST SERPL-MCNC: 213 MG/DL — HIGH (ref 10–199)
CO2 SERPL-SCNC: 15 MMOL/L — LOW (ref 22–31)
CREAT SERPL-MCNC: 2.01 MG/DL — HIGH (ref 0.5–1.3)
FSP PPP-MCNC: >=20 — SIGNIFICANT CHANGE UP
GAS PNL BLDA: SIGNIFICANT CHANGE UP
GLUCOSE SERPL-MCNC: 475 MG/DL — CRITICAL HIGH (ref 70–99)
HBA1C BLD-MCNC: 8.8 % — HIGH (ref 4–5.6)
HCT VFR BLD CALC: 42.4 % — SIGNIFICANT CHANGE UP (ref 39–50)
HDLC SERPL-MCNC: 41 MG/DL — SIGNIFICANT CHANGE UP
HGB BLD-MCNC: 15.6 G/DL — SIGNIFICANT CHANGE UP (ref 13–17)
INR BLD: 1.02 RATIO — SIGNIFICANT CHANGE UP (ref 0.88–1.16)
LIPID PNL WITH DIRECT LDL SERPL: 104 MG/DL — HIGH
MAGNESIUM SERPL-MCNC: 1.9 MG/DL — SIGNIFICANT CHANGE UP (ref 1.6–2.6)
MCHC RBC-ENTMCNC: 31.7 PG — SIGNIFICANT CHANGE UP (ref 27–34)
MCHC RBC-ENTMCNC: 36.8 GM/DL — HIGH (ref 32–36)
MCV RBC AUTO: 86.2 FL — SIGNIFICANT CHANGE UP (ref 80–100)
NRBC # BLD: 0 /100 WBCS — SIGNIFICANT CHANGE UP (ref 0–0)
PHOSPHATE SERPL-MCNC: 1 MG/DL — CRITICAL LOW (ref 2.5–4.5)
PLATELET # BLD AUTO: 401 K/UL — HIGH (ref 150–400)
POTASSIUM SERPL-MCNC: 2.6 MMOL/L — CRITICAL LOW (ref 3.5–5.3)
POTASSIUM SERPL-SCNC: 2.6 MMOL/L — CRITICAL LOW (ref 3.5–5.3)
PROT SERPL-MCNC: 6.9 G/DL — SIGNIFICANT CHANGE UP (ref 6–8.3)
PROTHROM AB SERPL-ACNC: 11.7 SEC — SIGNIFICANT CHANGE UP (ref 10–12.9)
RBC # BLD: 4.92 M/UL — SIGNIFICANT CHANGE UP (ref 4.2–5.8)
RBC # FLD: 13.6 % — SIGNIFICANT CHANGE UP (ref 10.3–14.5)
RH IG SCN BLD-IMP: POSITIVE — SIGNIFICANT CHANGE UP
SODIUM SERPL-SCNC: 138 MMOL/L — SIGNIFICANT CHANGE UP (ref 135–145)
T3 SERPL-MCNC: 92 NG/DL — SIGNIFICANT CHANGE UP (ref 80–200)
T4 AB SER-ACNC: 6.7 UG/DL — SIGNIFICANT CHANGE UP (ref 4.6–12)
TOTAL CHOLESTEROL/HDL RATIO MEASUREMENT: 5.2 RATIO — SIGNIFICANT CHANGE UP (ref 3.4–9.6)
TRIGL SERPL-MCNC: 339 MG/DL — HIGH (ref 10–149)
TROPONIN T, HIGH SENSITIVITY RESULT: 209 NG/L — HIGH (ref 0–51)
TSH SERPL-MCNC: 4.13 UIU/ML — SIGNIFICANT CHANGE UP (ref 0.27–4.2)
WBC # BLD: 24.1 K/UL — HIGH (ref 3.8–10.5)
WBC # FLD AUTO: 24.1 K/UL — HIGH (ref 3.8–10.5)

## 2019-11-13 PROCEDURE — 71045 X-RAY EXAM CHEST 1 VIEW: CPT | Mod: 26

## 2019-11-13 PROCEDURE — 99497 ADVNCD CARE PLAN 30 MIN: CPT | Mod: 25

## 2019-11-13 PROCEDURE — 99291 CRITICAL CARE FIRST HOUR: CPT

## 2019-11-13 PROCEDURE — 99292 CRITICAL CARE ADDL 30 MIN: CPT

## 2019-11-13 PROCEDURE — 99223 1ST HOSP IP/OBS HIGH 75: CPT

## 2019-11-13 RX ORDER — ROBINUL 0.2 MG/ML
0.2 INJECTION INTRAMUSCULAR; INTRAVENOUS ONCE
Refills: 0 | Status: COMPLETED | OUTPATIENT
Start: 2019-11-13 | End: 2019-11-13

## 2019-11-13 RX ORDER — SODIUM CHLORIDE 9 MG/ML
1000 INJECTION INTRAMUSCULAR; INTRAVENOUS; SUBCUTANEOUS ONCE
Refills: 0 | Status: COMPLETED | OUTPATIENT
Start: 2019-11-13 | End: 2019-11-13

## 2019-11-13 RX ORDER — HYDROMORPHONE HYDROCHLORIDE 2 MG/ML
2 INJECTION INTRAMUSCULAR; INTRAVENOUS; SUBCUTANEOUS
Refills: 0 | Status: DISCONTINUED | OUTPATIENT
Start: 2019-11-13 | End: 2019-11-13

## 2019-11-13 RX ORDER — HYDROMORPHONE HYDROCHLORIDE 2 MG/ML
4 INJECTION INTRAMUSCULAR; INTRAVENOUS; SUBCUTANEOUS
Refills: 0 | Status: DISCONTINUED | OUTPATIENT
Start: 2019-11-13 | End: 2019-11-14

## 2019-11-13 RX ORDER — AMIODARONE HYDROCHLORIDE 400 MG/1
1 TABLET ORAL
Qty: 900 | Refills: 0 | Status: DISCONTINUED | OUTPATIENT
Start: 2019-11-13 | End: 2019-11-13

## 2019-11-13 RX ORDER — POTASSIUM CHLORIDE 20 MEQ
10 PACKET (EA) ORAL
Refills: 0 | Status: DISCONTINUED | OUTPATIENT
Start: 2019-11-13 | End: 2019-11-13

## 2019-11-13 RX ORDER — AMIODARONE HYDROCHLORIDE 400 MG/1
150 TABLET ORAL ONCE
Refills: 0 | Status: COMPLETED | OUTPATIENT
Start: 2019-11-13 | End: 2019-11-13

## 2019-11-13 RX ORDER — FENTANYL CITRATE 50 UG/ML
50 INJECTION INTRAVENOUS ONCE
Refills: 0 | Status: DISCONTINUED | OUTPATIENT
Start: 2019-11-13 | End: 2019-11-13

## 2019-11-13 RX ORDER — CHLORHEXIDINE GLUCONATE 213 G/1000ML
15 SOLUTION TOPICAL EVERY 12 HOURS
Refills: 0 | Status: DISCONTINUED | OUTPATIENT
Start: 2019-11-13 | End: 2019-11-13

## 2019-11-13 RX ORDER — SENNA PLUS 8.6 MG/1
2 TABLET ORAL AT BEDTIME
Refills: 0 | Status: DISCONTINUED | OUTPATIENT
Start: 2019-11-13 | End: 2019-11-13

## 2019-11-13 RX ORDER — NICARDIPINE HYDROCHLORIDE 30 MG/1
5 CAPSULE, EXTENDED RELEASE ORAL
Qty: 40 | Refills: 0 | Status: DISCONTINUED | OUTPATIENT
Start: 2019-11-13 | End: 2019-11-13

## 2019-11-13 RX ORDER — CHLORHEXIDINE GLUCONATE 213 G/1000ML
1 SOLUTION TOPICAL
Refills: 0 | Status: DISCONTINUED | OUTPATIENT
Start: 2019-11-13 | End: 2019-11-13

## 2019-11-13 RX ORDER — AMIODARONE HYDROCHLORIDE 400 MG/1
150 TABLET ORAL ONCE
Refills: 0 | Status: DISCONTINUED | OUTPATIENT
Start: 2019-11-13 | End: 2019-11-13

## 2019-11-13 RX ORDER — ACETAMINOPHEN 500 MG
1000 TABLET ORAL ONCE
Refills: 0 | Status: COMPLETED | OUTPATIENT
Start: 2019-11-13 | End: 2019-11-13

## 2019-11-13 RX ORDER — HYDROMORPHONE HYDROCHLORIDE 2 MG/ML
1 INJECTION INTRAMUSCULAR; INTRAVENOUS; SUBCUTANEOUS
Qty: 100 | Refills: 0 | Status: DISCONTINUED | OUTPATIENT
Start: 2019-11-13 | End: 2019-11-13

## 2019-11-13 RX ORDER — DEXTROSE MONOHYDRATE, SODIUM CHLORIDE, AND POTASSIUM CHLORIDE 50; .745; 4.5 G/1000ML; G/1000ML; G/1000ML
1000 INJECTION, SOLUTION INTRAVENOUS
Refills: 0 | Status: DISCONTINUED | OUTPATIENT
Start: 2019-11-13 | End: 2019-11-13

## 2019-11-13 RX ORDER — FENTANYL CITRATE 50 UG/ML
0.5 INJECTION INTRAVENOUS
Qty: 5000 | Refills: 0 | Status: DISCONTINUED | OUTPATIENT
Start: 2019-11-13 | End: 2019-11-13

## 2019-11-13 RX ORDER — LEVETIRACETAM 250 MG/1
500 TABLET, FILM COATED ORAL EVERY 12 HOURS
Refills: 0 | Status: DISCONTINUED | OUTPATIENT
Start: 2019-11-13 | End: 2019-11-14

## 2019-11-13 RX ORDER — HYDROMORPHONE HYDROCHLORIDE 2 MG/ML
2 INJECTION INTRAMUSCULAR; INTRAVENOUS; SUBCUTANEOUS
Qty: 100 | Refills: 0 | Status: DISCONTINUED | OUTPATIENT
Start: 2019-11-13 | End: 2019-11-14

## 2019-11-13 RX ORDER — PROPOFOL 10 MG/ML
10 INJECTION, EMULSION INTRAVENOUS
Qty: 1000 | Refills: 0 | Status: DISCONTINUED | OUTPATIENT
Start: 2019-11-13 | End: 2019-11-13

## 2019-11-13 RX ORDER — ROBINUL 0.2 MG/ML
0.4 INJECTION INTRAMUSCULAR; INTRAVENOUS EVERY 6 HOURS
Refills: 0 | Status: DISCONTINUED | OUTPATIENT
Start: 2019-11-13 | End: 2019-11-14

## 2019-11-13 RX ADMIN — NICARDIPINE HYDROCHLORIDE 25 MG/HR: 30 CAPSULE, EXTENDED RELEASE ORAL at 05:30

## 2019-11-13 RX ADMIN — HYDROMORPHONE HYDROCHLORIDE 4 MILLIGRAM(S): 2 INJECTION INTRAMUSCULAR; INTRAVENOUS; SUBCUTANEOUS at 17:40

## 2019-11-13 RX ADMIN — HYDROMORPHONE HYDROCHLORIDE 2 MILLIGRAM(S): 2 INJECTION INTRAMUSCULAR; INTRAVENOUS; SUBCUTANEOUS at 13:03

## 2019-11-13 RX ADMIN — HYDROMORPHONE HYDROCHLORIDE 2 MILLIGRAM(S): 2 INJECTION INTRAMUSCULAR; INTRAVENOUS; SUBCUTANEOUS at 13:10

## 2019-11-13 RX ADMIN — ROBINUL 0.2 MILLIGRAM(S): 0.2 INJECTION INTRAMUSCULAR; INTRAVENOUS at 15:40

## 2019-11-13 RX ADMIN — HYDROMORPHONE HYDROCHLORIDE 2 MG/HR: 2 INJECTION INTRAMUSCULAR; INTRAVENOUS; SUBCUTANEOUS at 13:30

## 2019-11-13 RX ADMIN — LEVETIRACETAM 400 MILLIGRAM(S): 250 TABLET, FILM COATED ORAL at 18:30

## 2019-11-13 RX ADMIN — CHLORHEXIDINE GLUCONATE 15 MILLILITER(S): 213 SOLUTION TOPICAL at 06:11

## 2019-11-13 RX ADMIN — PROPOFOL 5.41 MICROGRAM(S)/KG/MIN: 10 INJECTION, EMULSION INTRAVENOUS at 05:50

## 2019-11-13 RX ADMIN — HYDROMORPHONE HYDROCHLORIDE 1 MG/HR: 2 INJECTION INTRAMUSCULAR; INTRAVENOUS; SUBCUTANEOUS at 11:35

## 2019-11-13 RX ADMIN — HYDROMORPHONE HYDROCHLORIDE 4 MILLIGRAM(S): 2 INJECTION INTRAMUSCULAR; INTRAVENOUS; SUBCUTANEOUS at 15:50

## 2019-11-13 RX ADMIN — SODIUM CHLORIDE 4000 MILLILITER(S): 9 INJECTION INTRAMUSCULAR; INTRAVENOUS; SUBCUTANEOUS at 04:23

## 2019-11-13 RX ADMIN — HYDROMORPHONE HYDROCHLORIDE 4 MILLIGRAM(S): 2 INJECTION INTRAMUSCULAR; INTRAVENOUS; SUBCUTANEOUS at 22:01

## 2019-11-13 RX ADMIN — Medication 400 MILLIGRAM(S): at 08:20

## 2019-11-13 RX ADMIN — LEVETIRACETAM 400 MILLIGRAM(S): 250 TABLET, FILM COATED ORAL at 06:11

## 2019-11-13 RX ADMIN — HYDROMORPHONE HYDROCHLORIDE 4 MILLIGRAM(S): 2 INJECTION INTRAMUSCULAR; INTRAVENOUS; SUBCUTANEOUS at 15:31

## 2019-11-13 RX ADMIN — FENTANYL CITRATE 2.25 MICROGRAM(S)/KG/HR: 50 INJECTION INTRAVENOUS at 05:49

## 2019-11-13 RX ADMIN — HYDROMORPHONE HYDROCHLORIDE 2 MG/HR: 2 INJECTION INTRAMUSCULAR; INTRAVENOUS; SUBCUTANEOUS at 14:00

## 2019-11-13 RX ADMIN — HYDROMORPHONE HYDROCHLORIDE 2 MILLIGRAM(S): 2 INJECTION INTRAMUSCULAR; INTRAVENOUS; SUBCUTANEOUS at 13:18

## 2019-11-13 RX ADMIN — Medication 1000 MILLIGRAM(S): at 09:00

## 2019-11-13 RX ADMIN — Medication 1 MILLIGRAM(S): at 12:53

## 2019-11-13 RX ADMIN — FENTANYL CITRATE 50 MICROGRAM(S): 50 INJECTION INTRAVENOUS at 04:30

## 2019-11-13 RX ADMIN — Medication 1 MILLIGRAM(S): at 17:12

## 2019-11-13 RX ADMIN — ROBINUL 0.4 MILLIGRAM(S): 0.2 INJECTION INTRAMUSCULAR; INTRAVENOUS at 18:30

## 2019-11-13 RX ADMIN — HYDROMORPHONE HYDROCHLORIDE 2 MILLIGRAM(S): 2 INJECTION INTRAMUSCULAR; INTRAVENOUS; SUBCUTANEOUS at 11:41

## 2019-11-13 RX ADMIN — HYDROMORPHONE HYDROCHLORIDE 4 MILLIGRAM(S): 2 INJECTION INTRAMUSCULAR; INTRAVENOUS; SUBCUTANEOUS at 18:00

## 2019-11-13 RX ADMIN — Medication 400 MILLIGRAM(S): at 22:02

## 2019-11-13 RX ADMIN — HYDROMORPHONE HYDROCHLORIDE 2 MILLIGRAM(S): 2 INJECTION INTRAMUSCULAR; INTRAVENOUS; SUBCUTANEOUS at 12:53

## 2019-11-13 RX ADMIN — AMIODARONE HYDROCHLORIDE 600 MILLIGRAM(S): 400 TABLET ORAL at 04:20

## 2019-11-13 RX ADMIN — ROBINUL 0.4 MILLIGRAM(S): 0.2 INJECTION INTRAMUSCULAR; INTRAVENOUS at 13:03

## 2019-11-13 RX ADMIN — HYDROMORPHONE HYDROCHLORIDE 2 MILLIGRAM(S): 2 INJECTION INTRAMUSCULAR; INTRAVENOUS; SUBCUTANEOUS at 12:00

## 2019-11-13 RX ADMIN — Medication 2 MILLIGRAM(S): at 05:00

## 2019-11-13 RX ADMIN — FENTANYL CITRATE 50 MICROGRAM(S): 50 INJECTION INTRAVENOUS at 04:45

## 2019-11-13 RX ADMIN — HYDROMORPHONE HYDROCHLORIDE 1 MG/HR: 2 INJECTION INTRAMUSCULAR; INTRAVENOUS; SUBCUTANEOUS at 11:17

## 2019-11-13 RX ADMIN — Medication 400 MILLIGRAM(S): at 04:30

## 2019-11-13 NOTE — CONSULT NOTE ADULT - SUBJECTIVE AND OBJECTIVE BOX
HPI:  This is an 82 yo M BIBEMS to [place] ED with his wife after he was found down on the floor of his house with seziure like activity.  According to the wife, he was not his usual self for the last 1 week complaining of fatigue and "forget-fullness." As per the wife he has a history of A-fib, he was not on any anticoagulation or antiplatelets.  He presented to [facility] ED where a code stroke was called because he was found to have acute right sided weakness, CT head at that time showed no hemorrhage.    Patient's symptoms began 3 hours before arrival to ED as per wife so the decision to give TPA was made.  TPA was given at 21:25 on 11/12.    Subsequently in [facility] ED, the patient then underwent a CTA at 00:27 on 11/13, which shows multiple intraparenchymal hemorrhages and slight IVH as per report.  The decision was made to intubate the patient at that time.  Difficult intubation 2/2 swollen tongue, anesthesia assisted with optic scope in OR.      [facility] called [facility] for transfer 2/2 hemorrhagic conversion post tpa, d/w ED attending to give 10 units of Cryoprecipitate, give plts if plt count <100,000 and control SBP< 140.      Patient was transferred to [facility].    Upon arrival, patient had multiple runs of V-tach and was in rapid a-fib.  Loaded with Amiodarone IV and started on gtt. (13 Nov 2019 05:09)    PERTINENT PM/SXH:   HTN (hypertension)  DM (diabetes mellitus)  Atrial fibrillation  GERD (gastroesophageal reflux disease)  CAD (coronary artery disease)  Obesity  Prostate CA  Osteoarthritis  Left bundle branch block (LBBB)  Irritable bowel syndrome  Diverticulosis  Diabetes mellitus  Hyperlipidemia  Hypertension  Atrial flutter    Carcinoma of prostate  H/O irritable bowel syndrome  Diverticulosis  Controlled type 2 diabetes with neuropathy  H/O left bundle branch block  H/O ventricular tachycardia  H/O hypercholesterolemia  Benign essential hypertension  Atrial flutter    FAMILY HISTORY:  Family history of early CAD (Father)    ITEMS NOT CHECKED ARE NOT PRESENT    SOCIAL HISTORY:   Significant other/partner:  [ x]  Children:  [ x]  Jain/Spirituality: Denominational   Substance hx:  [ ]   Tobacco hx:  [ ]   Alcohol hx: [ ]   Home Opioid hx:  [ ] I-Stop Reference No:  Living Situation: [x ]Home  [ ]Long term care  [ ]Rehab [ ]Other    ADVANCE DIRECTIVES:    DNR  Yes  MOLST  [x ]  Living Will  [ ]   DECISION MAKER(s):  [ ] Health Care Proxy(s)  [x ] Surrogate(s)  [ ] Guardian           Name(s): Phone Number(s):  Jasmine Cruz   BASELINE (I)ADL(s) (prior to admission):  Cl[place]: [x ]Total  [ ] Moderate [ ]Dependent    Allergies    No Known Allergies    Intolerances    MEDICATIONS  (STANDING):  chlorhexidine 0.12% Liquid 15 milliLiter(s) Oral Mucosa every 12 hours  chlorhexidine 4% Liquid 1 Application(s) Topical <User Schedule>  glycopyrrolate Injectable 0.4 milliGRAM(s) IV Push every 6 hours  HYDROmorphone Infusion 1 mG/Hr (1 mL/Hr) IV Continuous <Continuous>  levETIRAcetam  IVPB 500 milliGRAM(s) IV Intermittent every 12 hours    MEDICATIONS  (PRN):  HYDROmorphone  Injectable 2 milliGRAM(s) IV Push every 1 hour PRN mild mod severe pain  HYDROmorphone  Injectable 2 milliGRAM(s) IV Push every 1 hour PRN dyspnea  LORazepam   Injectable 1 milliGRAM(s) IV Push every 1 hour PRN Agitation    PRESENT SYMPTOMS: [x ]Unable to obtain due to poor mentation   Source if other than patient:  [ ]Family   [ ]Team     Pain: [ ] yes [ ] no  QOL impact -   Location -                    Aggravating factors -  Quality -  Radiation -  Timing-  Severity (0-10 scale):  Minimal acceptable level (0-10 scale):     PAIN AD Score: 0    [email or web address] (press ctrl +  left click to view)    Dyspnea:                           [ ]Mild [ ]Moderate [ ]Severe  Anxiety:                             [ ]Mild [ ]Moderate [ ]Severe  Fatigue:                             [ ]Mild [ ]Moderate [ ]Severe  Nausea:                             [ ]Mild [ ]Moderate [ ]Severe  Loss of appetite:              [ ]Mild [ ]Moderate [ ]Severe  Constipation:                    [ ]Mild [ ]Moderate [ ]Severe    Other Symptoms:  [x ]All other review of systems negative     Karnofsky Performance Score/Palliative Performance Status Version 2:     10    %    http://npcrc.org/files/news/palliative_performance_scale_ppsv2.pdf  PHYSICAL EXAM:  Vital Signs Last 24 Hrs  T(C): 39.3 (13 Nov 2019 08:00), Max: 39.3 (13 Nov 2019 08:00)  T(F): 102.8 (13 Nov 2019 08:00), Max: 102.8 (13 Nov 2019 08:00)  HR: 76 (13 Nov 2019 09:29) (72 - 95)  BP: 98/58 (13 Nov 2019 08:00) (98/58 - 158/80)  BP(mean): 70 (13 Nov 2019 08:00) (70 - 103)  RR: 12 (13 Nov 2019 08:00) (12 - 30)  SpO2: 97% (13 Nov 2019 09:29) (97% - 100%) I&O's Summary    12 Nov 2019 07:01  -  13 Nov 2019 07:00  --------------------------------------------------------  IN: 1705.3 mL / OUT: 0 mL / NET: 1705.3 mL    13 Nov 2019 07:01  -  13 Nov 2019 10:51  --------------------------------------------------------  IN: 134.2 mL / OUT: 0 mL / NET: 134.2 mL    GENERAL:  [ ]Alert  [ ]Oriented x   [ ]Lethargic  [ ]Cachexia  [ x]Unarousable  [ ]Verbal  [ x]Non-Verbal  Behavioral:   [ ] Anxiety  [ ] Delirium [ ] Agitation [ ] Other  HEENT:  [ ]Normal   [ ]Dry mouth   [ x]ET Tube/Trach  [ ]Oral lesions  PULMONARY:   [ ]Clear [ ]Tachypnea  [ ]Audible excessive secretions   [ ]Rhonchi        [ ]Right [ ]Left [ ]Bilateral  [ ]Crackles        [ ]Right [ ]Left [ ]Bilateral  [ ]Wheezing     [ ]Right [ ]Left [ ]Bilateral  CARDIOVASCULAR:    [ ]Regular [ ]Irregular [ ]Tachy  [ ]Orion [ ]Murmur [ ]Other  GASTROINTESTINAL:  [ ]Soft  [ x]Distended   [ x]+BS  [ ]Non tender [ ]Tender  [ ]PEG [ ]OGT/ NGT  Last BM:   GENITOURINARY:  [ ]Normal [ ] Incontinent   [ ]Oliguria/Anuria   [ x]Vargas  MUSCULOSKELETAL:   [ ]Normal   [ ]Weakness  [x ]Bed/Wheelchair bound [ ]Edema  NEUROLOGIC:   [ ]No focal deficits  [x ] Cognitive impairment  [ ] Dysphagia [ ]Dysarthria [ ] Paresis [ ]Other   SKIN:   [ ]Normal   [ ]Pressure ulcer(s)  [ ]Rash    CRITICAL CARE:  [ ] Shock Present  [ ]Septic [ ]Cardiogenic [ ]Neurologic [ ]Hypovolemic  [ ]  Vasopressors [ ]  Inotropes   [x ] Respiratory failure present [x ] mechanical ventilation [ ] non-invasive ventilatory support [ ] High flow  [ ] Acute  [ ] Chronic [ ] Hypoxic  [ ] Hypercarbic [ ] Other  [ ] Other organ failure     LABS:                        15.6   24.10 )-----------( 401      ( 13 Nov 2019 04:22 )             42.4   11-13    138  |  95<L>  |  22  ----------------------------<  475<HH>  2.6<LL>   |  15<L>  |  2.01<H>    Ca    9.6      13 Nov 2019 04:22  Phos  1.0     11-13  Mg     1.9     11-13    TPro  6.9  /  Alb  3.8  /  TBili  0.7  /  DBili  0.2  /  AST  46<H>  /  ALT  47<H>  /  AlkPhos  67  11-13  PT/INR - ( 13 Nov 2019 04:22 )   PT: 11.7 sec;   INR: 1.02 ratio         PTT - ( 13 Nov 2019 04:22 )  PTT:23.6 sec      RADIOLOGY & ADDITIONAL STUDIES:        < from: MR Cardiac w/wo IV Cont (02.26.19 @ 16:09) >      INTERPRETATION:  MRI CARDIAC WITHOUT AND WITH CONTRAST      INDICATION: Cardiomyopathy. Evaluate left ventricular function.   Echocardiography was unable to evaluate left ventricular function. Phase   contrast images were obtained of the left ventricular outflow tract.    COMPARISON: No prior studies available for comparison.    TECHNIQUE: Multi-sequential, multi-planar cardiac MRI was performed   before and after the intravenous administration of 9.5 mL of Gadavist;   0.5 mL was discarded. Resting myocardial perfusion imaging was performed.   Late gadolinium enhanced images were obtained. Phase contrast images were   obtained of the left ventricular outflow tract.    SCANNER: Siemens 1.5 T Aera platform using a cardiac coil.    QUALITY: Good.      FINDINGS:      MORPHOLOGY:      RIGHT ATRIUM: Left atrium measures 4.0 cm in the four-chamber plane. The   inflow of the IVC and SVC are normal.    RIGHT VENTRICLE: The right ventricle measures 4.3 cm in the four-chamber   plane. No free wall thickening of the right ventricle. No convincing   focus of late gadolinium enhancement.    LEFT ATRIUM: The left atrium measures 3.6 cm in the 3 chamber plane.   There are bilateral superior and inferior pulmonary veins..    LEFT VENTRICLE: The left ventricle measures 5.4 cm   (anteroseptal/inferolateral). No asymmetric or segmental thickening of   the left ventricular myocardium. There is no convincing focus of   increased T2 signal.There is no resting first pass perfusion abnormality.     There is subtle late gadolinium enhancement along the inferior hinge   point of the right ventricle. The linear increased signal along the   anteroseptal wall at the base is secondary to artifact from trabeculated   myocardium and slice thickness.      FUNCTION: Left ventricle is hypokinetic. There is hypokinesis of the   anteroseptal and inferoseptal walls at the base and mid cavity. In   addition, there is hypokinesis of the septum and inferior wall at the   apical level. There are subtle paradoxical septal motion.    LEFT VENTRICLE:  Unindexed:  EF: 43.94 %  EDV: 197.35 mL  ESV: 110.63 mL  SV: 86.72 mL  CO: 4.63 L/min    Indexed:  EDVi: 92.97 mL/m2  ESVi: 52.12 mL/m2  SV: 40.86 mL/m2  CO: 2.18 L/min/m2      VALVES:  TRICUSPID VALVE: Tricuspid regurgitation.  MITRAL VALVE: Mitral regurgitation. No systolic anterior motion of the   mitral valve. The anterior mitral valveleaflet and posterior mitral   valve leaflet on qualitatively thickened.  AORTIC VALVE: Aortic regurgitation.      AORTA: Three-vessel left-sided aortic arch and left-sided descending   thoracic aorta.      NONCARDIAC FINDINGS: Hepatic lesions have increased signal on the T1 and   T2-weighted images and though incomplete characterized likely represent   cysts.      IMPRESSION:    1.  The calculated left ventricular ejection fraction is 43.94%.  2.  Subtle foci of late gadolinium enhancement alongthe inferior hinge   point of the right ventricle could be secondary to altered ventricular   mechanics or alternatively elevated pulmonary      < from: CT Chest No Cont (10.08.18 @ 08:47) >      INTERPRETATION:  CLINICAL INFORMATION: History of protracted hiccups.   Rule out mass.    COMPARISON: None.    PROCEDURE:   CT of the Chest was performed without intravenous contrast.  Sagittal and coronal reformats were performed.  Maximum intensity projection images were generated.     FINDINGS:    The quality of the images are degraded by respiratory motion.    LUNGS AND LARGE AIRWAYS: Patent central airways. No bronchial wall   thickening or bronchiectasis.     No lung mass or consolidation. Punctate calcified granuloma in the right   upper lobe.    PLEURA: Trace right pleural effusion. No pneumothorax.    VESSELS: Atherosclerotic changes. Thoracic aorta and pulmonary artery are   normal in course and caliber. Mild aortic valve leaflet calcifications.    HEART: Heart size is normal. No pericardial effusion. Coronary   calcifications.    MEDIASTINUM AND SERGEY: No lymphadenopathy. Calcified mediastinal lymph   nodes.    CHEST WALL AND LOWER NECK: 1.6 cm nodule within the left thyroid gland.    VISUALIZED UPPER ABDOMEN: Hepatic cysts. Atherosclerosis.    BONES: Degenerative osseous changes.     IMPRESSION:     Trace right pleural effusion. No lung massor consolidation. No   suspicious pulmonary nodule.    Remote granulomatous disease.    Coronary and aortic atherosclerosis.    1.6 cm left thyroid nodule. Recommend evaluation with dedicated   ultrasound.                      < end of copied text >      PROTEIN CALORIE MALNUTRITION PRESENT: [ ] Yes [ ] No  [ ] PPSV2 < or = to 30% [ ] significant weight loss  [ ] poor nutritional intake [ ] catabolic state [ ] anasarca     Albumin, Serum: 3.8 g/dL (11-13-19 @ 04:22)  Artificial Nutrition [ ]     REFERRALS:   [ ]Chaplaincy  [ ] Hospice  [ ]Child Life  [x ]Social Work  [ ]Case management [ ]Holistic Therapy     Goals of Care Document:

## 2019-11-13 NOTE — GOALS OF CARE CONVERSATION - ADVANCED CARE PLANNING - CONVERSATION DETAILS
Spent 30 minutes with patient wife/surrogate Antonia with extensive family at bedside.  It was clear from the onset of our discussion that patient would not want to live a life of dependency or tethered to artificial means to prolong his life.  Family all in agreement with compassionate extubation and full comfort approach to include but not limited to :  no iv antibiotics, no iv fluids, no blood draws no diagnostics no artificial nutrition no aggressive medical interventions, maintain MOLST directives (documents in chart).  If patient lingers post extubation may be transferred to pcu for further end of life care.  NO bed available at time of this note.

## 2019-11-13 NOTE — DISCHARGE NOTE NURSING/CASE MANAGEMENT/SOCIAL WORK - NSDCPEPTSTRK_GEN_ALL_CORE
Need for follow up after discharge/Prescribed medications/Stroke education booklet/Call 911 for stroke/Risk factors for stroke/Stroke warning signs and symptoms/Signs and symptoms of stroke/Stroke support groups for patients, families, and friends

## 2019-11-13 NOTE — CHART NOTE - NSCHARTNOTEFT_GEN_A_CORE
CAPRINI SCORE [CLOT] Score on Admission for     AGE RELATED RISK FACTORS                                                       MOBILITY RELATED FACTORS  [ ] Age 41-60 years                                            (1 Point)                  [ x] Bed rest                                                        (1 Point)  [ ] Age: 61-74 years                                           (2 Points)                 [ ] Plaster cast                                                   (2 Points)  [ x] Age= 75 years                                              (3 Points)                 [ ] Bed bound for more than 72 hours                 (2 Points)    DISEASE RELATED RISK FACTORS                                               GENDER SPECIFIC FACTORS  [ ] Edema in the lower extremities                       (1 Point)                  [ ] Pregnancy                                                     (1 Point)  [ ] Varicose veins                                               (1 Point)                  [ ] Post-partum < 6 weeks                                   (1 Point)             [x ] BMI > 25 Kg/m2                                            (1 Point)                  [ ] Hormonal therapy  or oral contraception          (1 Point)                 [ ] Sepsis (in the previous month)                        (1 Point)                  [ ] History of pregnancy complications                 (1 point)  [ ] Pneumonia or serious lung disease                                               [ ] Unexplained or recurrent                     (1 Point)           (in the previous month)                               (1 Point)  [ ] Abnormal pulmonary function test                     (1 Point)                 SURGERY RELATED RISK FACTORS (include planned surgeries)  [ ] Acute myocardial infarction                              (1 Point)                 [ ]  Section                                             (1 Point)  [ ] Congestive heart failure (in the previous month)  (1 Point)             [ ] Minor surgery                                                  (1 Point)   [ ] Inflammatory bowel disease                             (1 Point)                 [ ] Arthroscopic surgery                                        (2 Points)  [ ] Central venous access                                      (2 Points)                [ ] General surgery lasting more than 45 minutes   (2 Points)       [x ] Stroke (in the previous month)                          (5 Points)               [ ] Elective arthroplasty                                         (5 Points)            [ ] current or past malignancy                              (2 Points)                                                                                                       HEMATOLOGY RELATED FACTORS                                                 TRAUMA RELATED RISK FACTORS  [ ] Prior episodes of VTE                                     (3 Points)                [ ] Fracture of the hip, pelvis, or leg                       (5 Points)  [ ] Positive family history for VTE                         (3 Points)                 [ ] Acute spinal cord injury (in the previous month)  (5 Points)  [ ] Prothrombin 90929 A                                     (3 Points)                 [ ] Paralysis  (less than 1 month)                             (5 Points)  [ ] Factor V Leiden                                             (3 Points)                  [ ] Multiple Trauma within 1 month                        (5 Points)  [ ] Lupus anticoagulants                                     (3 Points)                                                           [ ] Anticardiolipin antibodies                               (3 Points)                                                       [ ] High homocysteine in the blood                      (3 Points)                                             [ ] Other congenital or acquired thrombophilia      (3 Points)                                                [ ] Heparin induced thrombocytopenia                  (3 Points)                                          Total Score [    10  ]    Risk:  Very low 0   Low 1 to 2   Moderate 3 to 4   High =5       VTE Prophylasix Recommednations:  [ x] mechanical pneumatic compression devices                                      [ ] contraindicated: _____________________  [ ] chemo prophylasix                                                                                   [ x] contraindicated ______heme conversion______________    **** HIGH LIKELIHOOD DVT PRESENT ON ADMISSION  [ ] (please order LE dopplers within 24 hours of admission)  comfort measures only CAPRINI SCORE [CLOT] Score on Admission for     AGE RELATED RISK FACTORS                                                       MOBILITY RELATED FACTORS  [ ] Age 41-60 years                                            (1 Point)                  [ x] Bed rest                                                        (1 Point)  [ ] Age: 61-74 years                                           (2 Points)                 [ ] Plaster cast                                                   (2 Points)  [ x] Age= 75 years                                              (3 Points)                 [ ] Bed bound for more than 72 hours                 (2 Points)    DISEASE RELATED RISK FACTORS                                               GENDER SPECIFIC FACTORS  [ ] Edema in the lower extremities                       (1 Point)                  [ ] Pregnancy                                                     (1 Point)  [ ] Varicose veins                                               (1 Point)                  [ ] Post-partum < 6 weeks                                   (1 Point)             [x ] BMI > 25 Kg/m2                                            (1 Point)                  [ ] Hormonal therapy  or oral contraception          (1 Point)                 [ ] Sepsis (in the previous month)                        (1 Point)                  [ ] History of pregnancy complications                 (1 point)  [ ] Pneumonia or serious lung disease                                               [ ] Unexplained or recurrent                     (1 Point)           (in the previous month)                               (1 Point)  [ ] Abnormal pulmonary function test                     (1 Point)                 SURGERY RELATED RISK FACTORS (include planned surgeries)  [ ] Acute myocardial infarction                              (1 Point)                 [ ]  Section                                             (1 Point)  [ ] Congestive heart failure (in the previous month)  (1 Point)             [ ] Minor surgery                                                  (1 Point)   [ ] Inflammatory bowel disease                             (1 Point)                 [ ] Arthroscopic surgery                                        (2 Points)  [ ] Central venous access                                      (2 Points)                [ ] General surgery lasting more than 45 minutes   (2 Points)       [x ] Stroke (in the previous month)                          (5 Points)               [ ] Elective arthroplasty                                         (5 Points)            [ x] current or past malignancy                              (2 Points)                                                                                                       HEMATOLOGY RELATED FACTORS                                                 TRAUMA RELATED RISK FACTORS  [ ] Prior episodes of VTE                                     (3 Points)                [ ] Fracture of the hip, pelvis, or leg                       (5 Points)  [ ] Positive family history for VTE                         (3 Points)                 [ ] Acute spinal cord injury (in the previous month)  (5 Points)  [ ] Prothrombin 73238 A                                     (3 Points)                 [ ] Paralysis  (less than 1 month)                             (5 Points)  [ ] Factor V Leiden                                             (3 Points)                  [ ] Multiple Trauma within 1 month                        (5 Points)  [ ] Lupus anticoagulants                                     (3 Points)                                                           [ ] Anticardiolipin antibodies                               (3 Points)                                                       [ ] High homocysteine in the blood                      (3 Points)                                             [ ] Other congenital or acquired thrombophilia      (3 Points)                                                [ ] Heparin induced thrombocytopenia                  (3 Points)                                          Total Score [    12  ]    Risk:  Very low 0   Low 1 to 2   Moderate 3 to 4   High =5       VTE Prophylasix Recommednations:  [ x] mechanical pneumatic compression devices                                      [ ] contraindicated: _____________________  [ ] chemo prophylasix                                                                                   [ x] contraindicated ______heme conversion______________    **** HIGH LIKELIHOOD DVT PRESENT ON ADMISSION  [ ] (please order LE dopplers within 24 hours of admission)  comfort measures only

## 2019-11-13 NOTE — PROVIDER CONTACT NOTE (CRITICAL VALUE NOTIFICATION) - RECOMMENDATIONS
1 Liter bolus, starting maintenance NS+2-K at 75ml/hr and potassium chloride supplementation [id number]

## 2019-11-13 NOTE — H&P ADULT - NSHPPHYSICALEXAM_GEN_ALL_CORE
On arrival with EMS:    Intubated  tongue large, red and swollen, blood around ET tube  Abd: large, obese gentlemen, soft, BS+  Lung: decreased breath sounds at bases b/l  CV:  irregularly irregular pulses   Neuro off sedation - NO OE, pupils 2 m mahogany sluggish, + right corneal, no left corneal, +cough/gag, overbreathes the vent, UE ext posturing b/l, LE no movement to noxious   skin: no edema, no ulcerations

## 2019-11-13 NOTE — H&P ADULT - HISTORY OF PRESENT ILLNESS
This is an 84 yo M BIBEMS to [place] ED with his wife after he was found down on the floor of his house with seziure like activity.  According to the wife, he was not his usual self for the last 1 week complaining of fatigue and "forget-fullness." As per the wife he has a history of A-fib, he was not on any anticoagulation or antiplatelets.  He presented to [place] ED where a code stroke was called because he was found to have acute right sided weakness, CT head at that time showed no hemorrhage.    Patient's symptoms began 3 hours before arrival to ED as per wife so the decision to give TPA was made.  TPA was given at 21:25 on 11/12.    Subsequently in [place] ED, the patient then underwent a CTA at 00:27 on 11/13, which shows multiple intraparenchymal hemorrhages and slight IVH as per report.  The decision was made to intubate the patient at that time.  Difficult intubation 2/2 swollen tongue, anesthesia assisted with optic scope in OR.      [place] ED called [facility] for transfer 2/2 hemorrhagic conversion post tpa, d/w ED attending to give 10 units of Cryoprecipitate, give plts if plt count <100,000 and control SBP< 140.      Patient was transferred to [facility].    Upon arrival, patient had multiple runs of V-tach and was in rapid a-fib.  Loaded with Amiodarone IV. This is an 82 yo M BIBEMS to [place] ED with his wife after he was found down on the floor of his house with seziure like activity.  According to the wife, he was not his usual self for the last 1 week complaining of fatigue and "forget-fullness." As per the wife he has a history of A-fib, he was not on any anticoagulation or antiplatelets.  He presented to [place] ED where a code stroke was called because he was found to have acute right sided weakness, CT head at that time showed no hemorrhage.    Patient's symptoms began 3 hours before arrival to ED as per wife so the decision to give TPA was made.  TPA was given at 21:25 on 11/12.    Subsequently in [place] ED, the patient then underwent a CTA at 00:27 on 11/13, which shows multiple intraparenchymal hemorrhages and slight IVH as per report.  The decision was made to intubate the patient at that time.  Difficult intubation 2/2 swollen tongue, anesthesia assisted with optic scope in OR.      [place] ED called [facility] for transfer 2/2 hemorrhagic conversion post tpa, d/w ED attending to give 10 units of Cryoprecipitate, give plts if plt count <100,000 and control SBP< 140.      Patient was transferred to [facility].    Upon arrival, patient had multiple runs of V-tach and was in rapid a-fib.  Loaded with Amiodarone IV and started on gtt.

## 2019-11-13 NOTE — DISCHARGE NOTE NURSING/CASE MANAGEMENT/SOCIAL WORK - PATIENT PORTAL LINK FT
You can access the FollowMyHealth Patient Portal offered by [facility] by registering at the following website: [email or web address]. By joining [employer]’s FollowMyHealth portal, you will also be able to view your health information using other applications (apps) compatible with our system.

## 2019-11-13 NOTE — PROGRESS NOTE ADULT - ASSESSMENT
ASSESSMENT/PLAN:  82 yo M with a-fib (not on anticoagulation per wife) presenting with right sided weakness s/p TPA at 21:25 with hemorrhagic conversion, acute resp failure, NSVT.      NEURO:   Pt's wife opted for comfort care measures only. Made DNR.  Would like to keep him intubated until more family arrives.  Continue analgesics (Fentanyl infusion)  No repeat imaging/labs   c/w full vent support until family arrives  Ativan prn  Palliative care consult    SOCIAL/FAMILY:  [x] updated at bedside [] family meeting    CODE STATUS:  [x] DNR [x] Palliative/Comfort Care    DISPOSITION:  [x] ICU [] Stroke Unit [] Floor [] EMU [] RCU [] PCU    [x] Patient is at high risk of neurologic deterioration/death due to:  seizures, hemorrhage, herniation     Time spent:    Contact: [phone] ASSESSMENT/PLAN:  84 yo M with Afib (not on anticoagulation per wife) presenting with right sided weakness s/p TPA at 21:25 with hemorrhagic conversion, acute resp failure, NSVT.      NEURO:   Pt's wife opted for comfort care measures only. Made DNR.  Would like to keep him intubated until more family arrives.  Continue analgesics (Fentanyl gtt)  No repeat imaging/labs   c/w full vent support until all family present  Ativan prn  Palliative care consulted.     SOCIAL/FAMILY:  [x] updated at bedside [] family meeting    CODE STATUS:  [x] DNR [x] Palliative/Comfort Care    DISPOSITION:  [x] ICU [] Stroke Unit [] Floor [] EMU [] RCU [] PCU    [x] Patient is at high risk of neurologic deterioration/death due to:  seizures, hemorrhage, herniation     Time spent:    Contact: [phone]

## 2019-11-13 NOTE — PROGRESS NOTE ADULT - SUBJECTIVE AND OBJECTIVE BOX
HPI: 82 yo M CHANDLER to [place] ED with his wife after he was found down on the floor of his house with seizure-like activity.  According to the wife, he was not his usual self for the last 1 week complaining of fatigue and "forget-fulness." As per the wife he has a history of A-fib, not on any anticoagulation or antiplatelets.  He presented to [place] ED where a code stroke was called because he was found to have acute right sided weakness, CT head at that time showed no hemorrhage.    Patient's symptoms began 3 hours before arrival to ED as per wife so the decision to give TPA was made.  TPA was given at 21:25 on 11/12.    Subsequently in [place] ED, he underwent a CTA at 00:27 on 11/13, which showed multiple intraparenchymal hemorrhages and slight IVH as per report.  The decision was made to intubate the patient at that time.  Difficult intubation 2/2 swollen tongue, anesthesia assisted with optic scope in OR.    [place] ED called [facility] for transfer 2/2 hemorrhagic conversion post tpa; d/w ED attending to give 10 units of cryoprecipitate, give plts if plt count <100,000 and control SBP< 140.      Transferred to [facility]. Upon arrival, patient had multiple runs of V-tach and was in rapid a-fib.  Loaded with Amiodarone IV and started on gtt.   NSCU attending discussed pt's condition with his wife. She requested that all aggressive measures be discontinued and for him to he kept comfortable.       Allergies and Intolerances: No Known Allergies:     PAST MEDICAL HISTORY:  Atrial fibrillation   Atrial flutter   CAD (coronary artery disease)   Diabetes mellitus type 2  Diverticulosis   DM (diabetes mellitus)   GERD  HTN (hypertension)   Hyperlipidemia   Hypertension   Irritable bowel syndrome   Left bundle branch block (LBBB)   Obesity   Osteoarthritis knee  Prostate CA.     PAST SURGICAL HISTORY:  Carcinoma of prostate cyber surgery 2011.       ICU Vital Signs Last 24 Hrs  T(C): 38.8 (13 Nov 2019 04:00), Max: 38.8 (13 Nov 2019 04:00)  T(F): 101.8 (13 Nov 2019 04:00), Max: 101.8 (13 Nov 2019 04:00)  HR: 78 (13 Nov 2019 06:00) (78 - 95)  BP: 114/67 (13 Nov 2019 06:00) (104/58 - 158/80)  BP(mean): 79 (13 Nov 2019 06:00) (70 - 103)  ABP: --  ABP(mean): --  RR: 17 (13 Nov 2019 06:00) (17 - 30)  SpO2: 99% (13 Nov 2019 06:00) (98% - 100%)      11-12-19 @ 07:01  -  11-13-19 @ 06:45  --------------------------------------------------------  IN: 1437.6 mL / OUT: 0 mL / NET: 1437.6 mL      Mode: AC/ CMV (Assist Control/ Continuous Mandatory Ventilation), RR (machine): 14, TV (machine): 500, FiO2: 50, PEEP: 5, ITime: 1, MAP: 10, PIP: 16  chlorhexidine 0.12% Liquid 15 milliLiter(s) Oral Mucosa every 12 hours  chlorhexidine 4% Liquid 1 Application(s) Topical <User Schedule>  fentaNYL   Infusion 0.5 MICROgram(s)/kG/Hr (2.255 mL/Hr) IV Continuous <Continuous>  levETIRAcetam  IVPB 500 milliGRAM(s) IV Intermittent every 12 hours  LORazepam   Injectable 1 milliGRAM(s) IV Push every 1 hour PRN  propofol Infusion 10 MICROgram(s)/kG/Min (5.412 mL/Hr) IV Continuous <Continuous>                          15.6   24.10 )-----------( 401      ( 13 Nov 2019 04:22 )             42.4     11-13    138  |  95<L>  |  22  ----------------------------<  475<HH>  2.6<LL>   |  15<L>  |  2.01<H>    Ca    9.6      13 Nov 2019 04:22  Phos  1.0     11-13  Mg     1.9     11-13    TPro  6.9  /  Alb  3.8  /  TBili  0.7  /  DBili  0.2  /  AST  46<H>  /  ALT  47<H>  /  AlkPhos  67  11-13    LIVER FUNCTIONS - ( 13 Nov 2019 04:22 )  Alb: 3.8 g/dL / Pro: 6.9 g/dL / ALK PHOS: 67 U/L / ALT: 47 U/L / AST: 46 U/L / GGT: x           ABG - ( 13 Nov 2019 04:10 )  pH, Arterial: 7.42  pH, Blood: x     /  pCO2: 29    /  pO2: 141   / HCO3: 18    / Base Excess: -4.4  /  SaO2: 99 HPI: 84 yo M CHANDLER to [place] ED with his wife after he was found down on the floor of his house with seizure-like activity.  According to the wife, he was not his usual self for the last 1 week complaining of fatigue and "forget-fulness." As per the wife he has a history of A-fib, not on any anticoagulation or antiplatelets.  He presented to [place] ED where a code stroke was called because he was found to have acute right sided weakness, CT head at that time showed no hemorrhage.    Patient's symptoms began 3 hours before arrival to ED as per wife so the decision to give TPA was made.  TPA was given at 21:25 on 11/12.    Subsequently in [place] ED, he underwent a CTA at 00:27 on 11/13, which showed multiple intraparenchymal hemorrhages and slight IVH as per report.  The decision was made to intubate the patient at that time.  Difficult intubation 2/2 swollen tongue, anesthesia assisted with optic scope in OR.    [place] ED called [facility] for transfer 2/2 hemorrhagic conversion post tpa; d/w ED attending to give 10 units of cryoprecipitate, give plts if plt count <100,000 and control SBP< 140.      Transferred to [facility]. Upon arrival, patient had multiple runs of V-tach and was in rapid a-fib.  Loaded with Amiodarone IV and started on gtt.   NSCU attending discussed pt's condition with his wife. She requested that all aggressive measures be discontinued and for him to he kept comfortable.       Allergies and Intolerances: No Known Allergies:     PAST MEDICAL HISTORY:  Atrial fibrillation   Atrial flutter   CAD (coronary artery disease)   Diabetes mellitus type 2  Diverticulosis   DM (diabetes mellitus)   GERD  HTN (hypertension)   Hyperlipidemia   Hypertension   Irritable bowel syndrome   Left bundle branch block (LBBB)   Obesity   Osteoarthritis knee  Prostate CA.     PAST SURGICAL HISTORY:  Carcinoma of prostate cyber surgery 2011.       ICU Vital Signs Last 24 Hrs  T(C): 38.8 (13 Nov 2019 04:00), Max: 38.8 (13 Nov 2019 04:00)  T(F): 101.8 (13 Nov 2019 04:00), Max: 101.8 (13 Nov 2019 04:00)  HR: 78 (13 Nov 2019 06:00) (78 - 95)  BP: 114/67 (13 Nov 2019 06:00) (104/58 - 158/80)  BP(mean): 79 (13 Nov 2019 06:00) (70 - 103)  ABP: --  ABP(mean): --  RR: 17 (13 Nov 2019 06:00) (17 - 30)  SpO2: 99% (13 Nov 2019 06:00) (98% - 100%)      11-12-19 @ 07:01  -  11-13-19 @ 06:45  --------------------------------------------------------  IN: 1437.6 mL / OUT: 0 mL / NET: 1437.6 mL      Mode: AC/ CMV (Assist Control/ Continuous Mandatory Ventilation), RR (machine): 14, TV (machine): 500, FiO2: 50, PEEP: 5, ITime: 1, MAP: 10, PIP: 16  chlorhexidine 0.12% Liquid 15 milliLiter(s) Oral Mucosa every 12 hours  chlorhexidine 4% Liquid 1 Application(s) Topical <User Schedule>  fentaNYL   Infusion 0.5 MICROgram(s)/kG/Hr (2.255 mL/Hr) IV Continuous <Continuous>  levETIRAcetam  IVPB 500 milliGRAM(s) IV Intermittent every 12 hours  LORazepam   Injectable 1 milliGRAM(s) IV Push every 1 hour PRN  propofol Infusion 10 MICROgram(s)/kG/Min (5.412 mL/Hr) IV Continuous <Continuous>                          15.6   24.10 )-----------( 401      ( 13 Nov 2019 04:22 )             42.4     11-13    138  |  95<L>  |  22  ----------------------------<  475<HH>  2.6<LL>   |  15<L>  |  2.01<H>    Ca    9.6      13 Nov 2019 04:22  Phos  1.0     11-13  Mg     1.9     11-13    TPro  6.9  /  Alb  3.8  /  TBili  0.7  /  DBili  0.2  /  AST  46<H>  /  ALT  47<H>  /  AlkPhos  67  11-13    LIVER FUNCTIONS - ( 13 Nov 2019 04:22 )  Alb: 3.8 g/dL / Pro: 6.9 g/dL / ALK PHOS: 67 U/L / ALT: 47 U/L / AST: 46 U/L / GGT: x           ABG - ( 13 Nov 2019 04:10 )  pH, Arterial: 7.42  pH, Blood: x     /  pCO2: 29    /  pO2: 141   / HCO3: 18    / Base Excess: -4.4  /  SaO2: 99              Exm: No EO, no cough. Gag deferred.   Not FC  WEak corneals HPI: 82 yo M CHANDLER to [place] ED with his wife after he was found down on the floor of his house with seizure-like activity.  According to the wife, he was not his usual self for the last 1 week complaining of fatigue and "forget-fulness." As per the wife he has a history of A-fib, not on any anticoagulation or antiplatelets.  He presented to [place] ED where a code stroke was called because he was found to have acute right sided weakness, CT head at that time showed no hemorrhage.    Patient's symptoms began 3 hours before arrival to ED as per wife so the decision to give TPA was made.  TPA was given at 21:25 on 11/12.    Subsequently in [place] ED, he underwent a CTA at 00:27 on 11/13, which showed multiple intraparenchymal hemorrhages and slight IVH as per report.  The decision was made to intubate the patient at that time.  Difficult intubation 2/2 swollen tongue, anesthesia assisted with optic scope in OR.    [place] ED called [facility] for transfer 2/2 hemorrhagic conversion post tpa; d/w ED attending to give 10 units of cryoprecipitate, give plts if plt count <100,000 and control SBP< 140.      Transferred to [facility]U. Upon arrival, patient had multiple runs of V-tach and was in rapid a-fib.  Loaded with Amiodarone IV and started on gtt.   NSCU attending discussed pt's condition with his wife. She requested that all aggressive measures be discontinued and for him to he kept comfortable.       Allergies and Intolerances: No Known Allergies:     PAST MEDICAL HISTORY:  Atrial fibrillation   Atrial flutter   CAD (coronary artery disease)   Diabetes mellitus type 2  Diverticulosis   DM (diabetes mellitus)   GERD  HTN (hypertension)   Hyperlipidemia   Hypertension   Irritable bowel syndrome   Left bundle branch block (LBBB)   Obesity   Osteoarthritis knee  Prostate CA.     PAST SURGICAL HISTORY:  Carcinoma of prostate cyber surgery 2011.       ICU Vital Signs Last 24 Hrs  T(C): 38.8 (13 Nov 2019 04:00), Max: 38.8 (13 Nov 2019 04:00)  T(F): 101.8 (13 Nov 2019 04:00), Max: 101.8 (13 Nov 2019 04:00)  HR: 78 (13 Nov 2019 06:00) (78 - 95)  BP: 114/67 (13 Nov 2019 06:00) (104/58 - 158/80)  BP(mean): 79 (13 Nov 2019 06:00) (70 - 103)  ABP: --  ABP(mean): --  RR: 17 (13 Nov 2019 06:00) (17 - 30)  SpO2: 99% (13 Nov 2019 06:00) (98% - 100%)      11-12-19 @ 07:01  -  11-13-19 @ 06:45  --------------------------------------------------------  IN: 1437.6 mL / OUT: 0 mL / NET: 1437.6 mL      Mode: AC/ CMV (Assist Control/ Continuous Mandatory Ventilation), RR (machine): 14, TV (machine): 500, FiO2: 50, PEEP: 5, ITime: 1, MAP: 10, PIP: 16  chlorhexidine 0.12% Liquid 15 milliLiter(s) Oral Mucosa every 12 hours  chlorhexidine 4% Liquid 1 Application(s) Topical <User Schedule>  fentaNYL   Infusion 0.5 MICROgram(s)/kG/Hr (2.255 mL/Hr) IV Continuous <Continuous>  levETIRAcetam  IVPB 500 milliGRAM(s) IV Intermittent every 12 hours  LORazepam   Injectable 1 milliGRAM(s) IV Push every 1 hour PRN  propofol Infusion 10 MICROgram(s)/kG/Min (5.412 mL/Hr) IV Continuous <Continuous>                          15.6   24.10 )-----------( 401      ( 13 Nov 2019 04:22 )             42.4     11-13    138  |  95<L>  |  22  ----------------------------<  475<HH>  2.6<LL>   |  15<L>  |  2.01<H>    Ca    9.6      13 Nov 2019 04:22  Phos  1.0     11-13  Mg     1.9     11-13    TPro  6.9  /  Alb  3.8  /  TBili  0.7  /  DBili  0.2  /  AST  46<H>  /  ALT  47<H>  /  AlkPhos  67  11-13    LIVER FUNCTIONS - ( 13 Nov 2019 04:22 )  Alb: 3.8 g/dL / Pro: 6.9 g/dL / ALK PHOS: 67 U/L / ALT: 47 U/L / AST: 46 U/L / GGT: x           ABG - ( 13 Nov 2019 04:10 )  pH, Arterial: 7.42  pH, Blood: x     /  pCO2: 29    /  pO2: 141   / HCO3: 18    / Base Excess: -4.4  /  SaO2: 99          Exam:   Gen: Intubated  HEENT: tongue large, red and swollen, scant blood around ET tube  Neuro off sedation - Not OE, pupils 2 mm and sluggish, +weak corneals B/L, +cough. Gag deferred. Overbreathes the vent. Not moving extremities.   Abd: Soft  Lung: decreased breath sounds at bases b/l  CV:  irregularly irregular  Extremities: Skin intact HPI: 84 yo M CHANDLER to [place] ED with his wife after he was found down on the floor of his house with seizure-like activity.  According to the wife, he was not his usual self for the last 1 week complaining of fatigue and "forget-fulness." As per the wife he has a history of A-fib, not on any anticoagulation or antiplatelets.  He presented to [place] ED where a code stroke was called because he was found to have acute right sided weakness, CT head at that time showed no hemorrhage.    Patient's symptoms began 3 hours before arrival to ED as per wife so the decision to give TPA was made.  TPA was given at 21:25 on 11/12.    Subsequently in [place] ED, he underwent a CTA at 00:27 on 11/13, which showed multiple intraparenchymal hemorrhages and slight IVH as per report.  The decision was made to intubate the patient at that time.  Difficult intubation 2/2 swollen tongue, anesthesia assisted with optic scope in OR.    [place] ED called [facility] for transfer 2/2 hemorrhagic conversion post tpa; d/w ED attending to give 10 units of cryoprecipitate, give plts if plt count <100,000 and control SBP< 140.      Transferred to [facility]U. Upon arrival, patient had multiple runs of V-tach and was in rapid a-fib.  Loaded with Amiodarone IV and started on gtt.   NSCU attending discussed pt's condition with his wife. She requested that all aggressive measures be discontinued and for him to he kept comfortable.     Allergies and Intolerances: No Known Allergies:     PAST MEDICAL HISTORY:  Atrial fibrillation   Atrial flutter   CAD (coronary artery disease)   Diabetes mellitus type 2  Diverticulosis   DM (diabetes mellitus)   GERD  HTN (hypertension)   Hyperlipidemia   Hypertension   Irritable bowel syndrome   Left bundle branch block (LBBB)   Obesity   Osteoarthritis knee  Prostate CA.     PAST SURGICAL HISTORY:  Carcinoma of prostate cyber surgery 2011.       ICU Vital Signs Last 24 Hrs  T(C): 38.8 (13 Nov 2019 04:00), Max: 38.8 (13 Nov 2019 04:00)  T(F): 101.8 (13 Nov 2019 04:00), Max: 101.8 (13 Nov 2019 04:00)  HR: 78 (13 Nov 2019 06:00) (78 - 95)  BP: 114/67 (13 Nov 2019 06:00) (104/58 - 158/80)  BP(mean): 79 (13 Nov 2019 06:00) (70 - 103)  ABP: --  ABP(mean): --  RR: 17 (13 Nov 2019 06:00) (17 - 30)  SpO2: 99% (13 Nov 2019 06:00) (98% - 100%)      11-12-19 @ 07:01  -  11-13-19 @ 06:45  --------------------------------------------------------  IN: 1437.6 mL / OUT: 0 mL / NET: 1437.6 mL      Mode: AC/ CMV (Assist Control/ Continuous Mandatory Ventilation), RR (machine): 14, TV (machine): 500, FiO2: 50, PEEP: 5, ITime: 1, MAP: 10, PIP: 16  chlorhexidine 0.12% Liquid 15 milliLiter(s) Oral Mucosa every 12 hours  chlorhexidine 4% Liquid 1 Application(s) Topical <User Schedule>  fentaNYL   Infusion 0.5 MICROgram(s)/kG/Hr (2.255 mL/Hr) IV Continuous <Continuous>  levETIRAcetam  IVPB 500 milliGRAM(s) IV Intermittent every 12 hours  LORazepam   Injectable 1 milliGRAM(s) IV Push every 1 hour PRN  propofol Infusion 10 MICROgram(s)/kG/Min (5.412 mL/Hr) IV Continuous <Continuous>                          15.6   24.10 )-----------( 401      ( 13 Nov 2019 04:22 )             42.4     11-13    138  |  95<L>  |  22  ----------------------------<  475<HH>  2.6<LL>   |  15<L>  |  2.01<H>    Ca    9.6      13 Nov 2019 04:22  Phos  1.0     11-13  Mg     1.9     11-13    TPro  6.9  /  Alb  3.8  /  TBili  0.7  /  DBili  0.2  /  AST  46<H>  /  ALT  47<H>  /  AlkPhos  67  11-13    LIVER FUNCTIONS - ( 13 Nov 2019 04:22 )  Alb: 3.8 g/dL / Pro: 6.9 g/dL / ALK PHOS: 67 U/L / ALT: 47 U/L / AST: 46 U/L / GGT: x           ABG - ( 13 Nov 2019 04:10 )  pH, Arterial: 7.42  pH, Blood: x     /  pCO2: 29    /  pO2: 141   / HCO3: 18    / Base Excess: -4.4  /  SaO2: 99          Exam:   Gen: Intubated  HEENT: tongue large, red and swollen, scant blood around ET tube  Neuro off sedation - Not OE, pupils 2 mm and sluggish, +weak corneals B/L, +cough. Gag deferred. Overbreathes the vent. Not moving extremities.   Abd: Soft  Lung: decreased breath sounds at bases b/l  CV:  irregularly irregular  Extremities: Skin intact

## 2019-11-13 NOTE — H&P ADULT - NSHPLABSRESULTS_GEN_ALL_CORE
VITALS:  Reviewed   LABS:  Reviewed  MEDICATIONS: Reviewed  IMAGING:  Recent imaging studies were reviewed.

## 2019-11-13 NOTE — H&P ADULT - ASSESSMENT
ASSESSMENT/PLAN:  84 yo M with a-fib ( not on coumadin as per wife) with right sided weakness s/p TPA at 21:25 with hemorrhagic conversion, acute resp failure.      After a long discussion with the patient's wife,  myself and Dr. Cortez ([facility] resident), the patient's wife understands that the patient suffered a catastrophic neurological event and has a poor clinical exam.  The wife understands that this stroke is an irreversible condition, and she would like to stop all aggressive measure that may cause him more suffering than benefit.   At this time she has opted, as per the patient's prior expressed wishes, to make the patient DNR and pursue comfort care measures only.  She would like to keep him intubated at this time until more family arrives.      No CT head at this time   stop all lab draws  no TF's  c/w full vent support   Fentanyl infusion  ativan prn    SOCIAL/FAMILY:  [x] updated at bedside [] family meeting    CODE STATUS:  [x] DNR [x] Palliative/Comfort Care    DISPOSITION:  [x] ICU [] Stroke Unit [] Floor [] EMU [] RCU [] PCU    [x] Patient is at high risk of neurologic deterioration/death due to:  seziures, hemorrhage, herniation       Time spent: 45 critical care minutes    Contact: [phone]

## 2019-11-13 NOTE — GOALS OF CARE CONVERSATION - ADVANCED CARE PLANNING - TREATMENT GUIDELINES
Comfort measures only/No blood draws/No antibiotics/Do not re-hospitalize/No IV fluids/DNR Order/No artificial nutrition

## 2019-11-13 NOTE — CONSULT NOTE ADULT - PROBLEM SELECTOR RECOMMENDATION 6
Spent 30 minutes with patient wife/surrogate Antonia with extensive family at bedside.  It was clear from the onset of our discussion that patient would not want to live a life of dependency or tethered to artificial means to prolong his life.  Family all in agreement with compassionate extubation and full comfort approach to include but not limited to :  no iv antibiotics, no iv fluids, no blood draws no diagnostics no artificial nutrition no aggressive medical interventions, maintain MOLST directives (documents in chart).  If patient lingers post extubation may be transferred to pcu for further end of life care.  NO bed available at time of this note. 156.9

## 2019-11-13 NOTE — CONSULT NOTE ADULT - ASSESSMENT
[age] yo M with a-fib (not on anticoagulation per wife) presenting with right sided weakness s/p TPA  with hemorrhagic conversion, acute resp failure, NSVT.  Called for goals of care     y

## 2019-11-13 NOTE — CONSULT NOTE ADULT - ASSESSMENT
Vijay Cruz  83M [facility] presents as a transfer from OSH after deterioration of neurological exam after tPA for suspected stroke. Patient initially presented after being found down. After administration of tpa patient had acute deterioration in neurological exam needing intubation (prolonged due to significant tongue swelling). Patient received 10 u cryoprecipitate prior to transfer. On arrival to NSCU patient was in unsustained Vtach, lactate of 11, troponin's elevated On exam off sedation Pupils 3 reactive, not fc, extensor posturing on UE nothing on lowers. CTA from 11 PM at OSH shows multifocal hemorrhages in b/l temporal lobes. Patient is currently to unstable to travel for repeat CTH.    - After discussion with family about current medical condition and poor clinical exam, patient will be DNR and CMO

## 2019-11-13 NOTE — PROGRESS NOTE ADULT - ATTENDING COMMENTS
Met with patient's wife Jasmine and several other family members. Reviewed hospital course and findings. Confirmed their wish to proceed with comfort care. Palliative Care consulted for support.

## 2019-11-13 NOTE — H&P ADULT - NSICDXPASTMEDICALHX_GEN_ALL_CORE_FT
PAST MEDICAL HISTORY:  Atrial fibrillation     Atrial flutter     CAD (coronary artery disease)     Diabetes mellitus type 2    Diverticulosis     DM (diabetes mellitus)     GERD (gastroesophageal reflux disease)     HTN (hypertension)     Hyperlipidemia     Hypertension     Irritable bowel syndrome     Left bundle branch block (LBBB)     Obesity     Osteoarthritis knee    Prostate CA

## 2019-11-13 NOTE — AIRWAY REMOVAL NOTE  ADULT & PEDS - ARTIFICAL AIRWAY REMOVAL COMMENTS
Written order for extubation verified. The patient was identified by full name and birth date compared to the identification band.  Present during the procedure was Trang JACKSON

## 2019-11-13 NOTE — CONSULT NOTE ADULT - PROBLEM SELECTOR RECOMMENDATION 9
Not a surgical candidate  Catastrophic neurological event with no likelihood for meaningful recovery  Current meds:  Propofol, Fentanyl  Will convert to:  D/C propofol prior to extubation  , initiate dilaudid infusion with prn available  Pre extubation:  dilaudid 2mg ivp, ativan 1mg ivp

## 2019-11-14 ENCOUNTER — APPOINTMENT (OUTPATIENT)
Dept: INTERNAL MEDICINE | Facility: CLINIC | Age: 84
End: 2019-11-14

## 2019-11-14 PROCEDURE — 99233 SBSQ HOSP IP/OBS HIGH 50: CPT

## 2019-11-14 PROCEDURE — 99231 SBSQ HOSP IP/OBS SF/LOW 25: CPT

## 2019-11-14 RX ORDER — ACETAMINOPHEN 500 MG
1000 TABLET ORAL ONCE
Refills: 0 | Status: COMPLETED | OUTPATIENT
Start: 2019-11-14 | End: 2019-11-14

## 2019-11-14 RX ORDER — HYDROMORPHONE HYDROCHLORIDE 2 MG/ML
4 INJECTION INTRAMUSCULAR; INTRAVENOUS; SUBCUTANEOUS
Qty: 100 | Refills: 0 | Status: DISCONTINUED | OUTPATIENT
Start: 2019-11-14 | End: 2019-11-14

## 2019-11-14 RX ORDER — ROBINUL 0.2 MG/ML
0.2 INJECTION INTRAMUSCULAR; INTRAVENOUS ONCE
Refills: 0 | Status: COMPLETED | OUTPATIENT
Start: 2019-11-14 | End: 2019-11-14

## 2019-11-14 RX ORDER — ROBINUL 0.2 MG/ML
0.4 INJECTION INTRAMUSCULAR; INTRAVENOUS EVERY 6 HOURS
Refills: 0 | Status: DISCONTINUED | OUTPATIENT
Start: 2019-11-14 | End: 2019-11-14

## 2019-11-14 RX ADMIN — ROBINUL 0.4 MILLIGRAM(S): 0.2 INJECTION INTRAMUSCULAR; INTRAVENOUS at 06:29

## 2019-11-14 RX ADMIN — Medication 400 MILLIGRAM(S): at 06:28

## 2019-11-14 RX ADMIN — ROBINUL 0.4 MILLIGRAM(S): 0.2 INJECTION INTRAMUSCULAR; INTRAVENOUS at 16:56

## 2019-11-14 RX ADMIN — HYDROMORPHONE HYDROCHLORIDE 4 MG/HR: 2 INJECTION INTRAMUSCULAR; INTRAVENOUS; SUBCUTANEOUS at 12:00

## 2019-11-14 RX ADMIN — Medication 1000 MILLIGRAM(S): at 12:30

## 2019-11-14 RX ADMIN — HYDROMORPHONE HYDROCHLORIDE 4 MILLIGRAM(S): 2 INJECTION INTRAMUSCULAR; INTRAVENOUS; SUBCUTANEOUS at 18:25

## 2019-11-14 RX ADMIN — HYDROMORPHONE HYDROCHLORIDE 4 MILLIGRAM(S): 2 INJECTION INTRAMUSCULAR; INTRAVENOUS; SUBCUTANEOUS at 18:09

## 2019-11-14 RX ADMIN — Medication 400 MILLIGRAM(S): at 12:05

## 2019-11-14 RX ADMIN — HYDROMORPHONE HYDROCHLORIDE 4 MILLIGRAM(S): 2 INJECTION INTRAMUSCULAR; INTRAVENOUS; SUBCUTANEOUS at 06:30

## 2019-11-14 RX ADMIN — HYDROMORPHONE HYDROCHLORIDE 4 MILLIGRAM(S): 2 INJECTION INTRAMUSCULAR; INTRAVENOUS; SUBCUTANEOUS at 01:29

## 2019-11-14 RX ADMIN — HYDROMORPHONE HYDROCHLORIDE 4 MG/HR: 2 INJECTION INTRAMUSCULAR; INTRAVENOUS; SUBCUTANEOUS at 11:23

## 2019-11-14 RX ADMIN — ROBINUL 0.4 MILLIGRAM(S): 0.2 INJECTION INTRAMUSCULAR; INTRAVENOUS at 01:29

## 2019-11-14 RX ADMIN — LEVETIRACETAM 400 MILLIGRAM(S): 250 TABLET, FILM COATED ORAL at 06:32

## 2019-11-14 NOTE — PROGRESS NOTE ADULT - SUBJECTIVE AND OBJECTIVE BOX
HPI:  This is an 82 yo M BIBEMS to [place] ED with his wife after he was found down on the floor of his house with seziure like activity.  According to the wife, he was not his usual self for the last 1 week complaining of fatigue and "forget-fullness." As per the wife he has a history of A-fib, he was not on any anticoagulation or antiplatelets.  He presented to [place] ED where a code stroke was called because he was found to have acute right sided weakness, CT head at that time showed no hemorrhage.    Patient's symptoms began 3 hours before arrival to ED as per wife so the decision to give TPA was made.  TPA was given at 21:25 on 11/12.    Subsequently in [place] ED, the patient then underwent a CTA at 00:27 on 11/13, which shows multiple intraparenchymal hemorrhages and slight IVH as per report.  The decision was made to intubate the patient at that time.  Difficult intubation 2/2 swollen tongue, anesthesia assisted with optic scope in OR.      [place] ED called [facility] for transfer 2/2 hemorrhagic conversion post tpa, d/w ED attending to give 10 units of Cryoprecipitate, give plts if plt count <100,000 and control SBP< 140.      Patient was transferred to [facility].    Upon arrival, patient had multiple runs of V-tach and was in rapid a-fib.  Loaded with Amiodarone IV and started on gtt. (13 Nov 2019 05:09)    PERTINENT PM/SXH:   HTN (hypertension)  DM (diabetes mellitus)  Atrial fibrillation  GERD (gastroesophageal reflux disease)  CAD (coronary artery disease)  Obesity  Prostate CA  Osteoarthritis  Left bundle branch block (LBBB)  Irritable bowel syndrome  Diverticulosis  Diabetes mellitus  Hyperlipidemia  Hypertension  Atrial flutter    Carcinoma of prostate  H/O irritable bowel syndrome  Diverticulosis  Controlled type 2 diabetes with neuropathy  H/O left bundle branch block  H/O ventricular tachycardia  H/O hypercholesterolemia  Benign essential hypertension  Atrial flutter    FAMILY HISTORY:  Family history of early CAD (Father)    ITEMS NOT CHECKED ARE NOT PRESENT    SOCIAL HISTORY:   Significant other/partner: wife [ x]  Children:  [ x]  Quaker/Spirituality: Denominational   Substance hx:  [ ]   Tobacco hx:  [ ]   Alcohol hx: [ ]   Home Opioid hx:  [ ] I-Stop Reference No:  Living Situation: [x ]Home  [ ]Long term care  [ ]Rehab [ ]Other    ADVANCE DIRECTIVES:    DNR  Yes  MOLST  [x ]  Living Will  [ ]   DECISION MAKER(s):  [ ] Health Care Proxy(s)  [x ] Surrogate(s)  [ ] Guardian           Name(s): Phone Number(s):  Jasmine Cruz   BASELINE (I)ADL(s) (prior to admission):  [place]: [x ]Total  [ ] Moderate [ ]Dependent    Allergies    No Known Allergies    Intolerances    MEDICATIONS  (STANDING):  HYDROmorphone Infusion 4 mG/Hr (4 mL/Hr) IV Continuous <Continuous>  levETIRAcetam  IVPB 500 milliGRAM(s) IV Intermittent every 12 hours    MEDICATIONS  (PRN):  glycopyrrolate Injectable 0.4 milliGRAM(s) IV Push every 6 hours PRN secretions  HYDROmorphone  Injectable 4 milliGRAM(s) IV Push every 1 hour PRN mild mod severe pain  HYDROmorphone  Injectable 4 milliGRAM(s) IV Push every 1 hour PRN dyspnea  LORazepam   Injectable 1 milliGRAM(s) IV Push every 1 hour PRN Agitation      PRESENT SYMPTOMS: [x ]Unable to obtain due to poor mentation   Source if other than patient:  [ ]Family   [ ]Team     Pain: [ ] yes [x ] no  QOL impact -   Location -                    Aggravating factors -  Quality -  Radiation -  Timing-  Severity (0-10 scale):  Minimal acceptable level (0-10 scale):     PAIN AD Score: 0    [email or web address].[place].[email or web address]/[email or web address] (press ctrl +  left click to view)    Dyspnea:                           [ ]Mild [ ]Moderate [ ]Severe  Anxiety:                             [ ]Mild [ ]Moderate [ ]Severe  Fatigue:                             [ ]Mild [ ]Moderate [ ]Severe  Nausea:                             [ ]Mild [ ]Moderate [ ]Severe  Loss of appetite:              [ ]Mild [ ]Moderate [ ]Severe  Constipation:                    [ ]Mild [ ]Moderate [ ]Severe    Other Symptoms:  [x ]All other review of systems negative     Karnofsky Performance Score/Palliative Performance Status Version 2:     10    %    http://npcrc.org/files/news/palliative_performance_scale_ppsv2.pdf    PHYSICAL EXAM:  Vital Signs Last 24 Hrs  T(C): 40.6 (14 Nov 2019 07:00), Max: 40.6 (14 Nov 2019 07:00)  T(F): 105 (14 Nov 2019 07:00), Max: 105 (14 Nov 2019 07:00)  HR: 109 (14 Nov 2019 08:00) (72 - 122)  BP: 133/66 (14 Nov 2019 08:00) (91/53 - 141/78)  BP(mean): 84 (14 Nov 2019 08:00) (63 - 95)  RR: 9 (14 Nov 2019 08:00) (8 - 17)  SpO2: 64% (14 Nov 2019 08:00) (57% - 97%)    GENERAL:  [ ]Alert  [ ]Oriented x   [ ]Lethargic  [ ]Cachexia  [ x]Unarousable  [ ]Verbal  [ x]Non-Verbal  Behavioral:   [ ] Anxiety  [ ] Delirium [ ] Agitation [ ] Other  HEENT:  [ ]Normal   [ ]Dry mouth   [ x]ET Tube/Trach  [ ]Oral lesions  PULMONARY:   [ ]Clear [ ]Tachypnea  [x ]Audible excessive secretions   [ ]Rhonchi        [ ]Right [ ]Left [ ]Bilateral  [ ]Crackles        [ ]Right [ ]Left [ ]Bilateral  [ ]Wheezing     [ ]Right [ ]Left [ ]Bilateral  CARDIOVASCULAR:    [ ]Regular [ ]Irregular [x ]Tachy  [ ]Orion [ ]Murmur [ ]Other  GASTROINTESTINAL:  [ ]Soft  [ x]Distended   [ x]+BS  [ ]Non tender [ ]Tender  [ ]PEG [ ]OGT/ NGT  Last BM:   GENITOURINARY:  [ ]Normal [ ] Incontinent   [ ]Oliguria/Anuria   [ x]Vargas  MUSCULOSKELETAL:   [ ]Normal   [x ]Weakness  [x ]Bed/Wheelchair bound [ ]Edema  NEUROLOGIC:   [ ]No focal deficits  [x ] Cognitive impairment  [ ] Dysphagia [ ]Dysarthria [ ] Paresis [ ]Other   SKIN:   [ ]Normal   [ ]Pressure ulcer(s)  [ ]Rash    CRITICAL CARE:  [ ] Shock Present  [ ] Septic [ ]Cardiogenic [ ]Neurologic [ ]Hypovolemic  [ ]  Vasopressors [ ]  Inotropes   [x] Respiratory failure present [x ] mechanical ventilation [ ] non-invasive ventilatory support [ ] High flow  [ ] Acute  [ ] Chronic [ ] Hypoxic  [ ] Hypercarbic [ ] Other  [ ] Other organ failure     LABS:                        15.6   24.10 )-----------( 401      ( 13 Nov 2019 04:22 )             42.4   11-13    138  |  95<L>  |  22  ----------------------------<  475<HH>  2.6<LL>   |  15<L>  |  2.01<H>    Ca    9.6      13 Nov 2019 04:22  Phos  1.0     11-13  Mg     1.9     11-13    TPro  6.9  /  Alb  3.8  /  TBili  0.7  /  DBili  0.2  /  AST  46<H>  /  ALT  47<H>  /  AlkPhos  67  11-13  PT/INR - ( 13 Nov 2019 04:22 )   PT: 11.7 sec;   INR: 1.02 ratio         PTT - ( 13 Nov 2019 04:22 )  PTT:23.6 sec      RADIOLOGY & ADDITIONAL STUDIES:        < from: MR Cardiac w/wo IV Cont (02.26.19 @ 16:09) >      INTERPRETATION:  MRI CARDIAC WITHOUT AND WITH CONTRAST      INDICATION: Cardiomyopathy. Evaluate left ventricular function.   Echocardiography was unable to evaluate left ventricular function. Phase   contrast images were obtained of the left ventricular outflow tract.    COMPARISON: No prior studies available for comparison.    TECHNIQUE: Multi-sequential, multi-planar cardiac MRI was performed   before and after the intravenous administration of 9.5 mL of Gadavist;   0.5 mL was discarded. Resting myocardial perfusion imaging was performed.   Late gadolinium enhanced images were obtained. Phase contrast images were   obtained of the left ventricular outflow tract.    SCANNER: Siemens 1.5 T Aera platform using a cardiac coil.    QUALITY: Good.      FINDINGS:      MORPHOLOGY:      RIGHT ATRIUM: Left atrium measures 4.0 cm in the four-chamber plane. The   inflow of the IVC and SVC are normal.    RIGHT VENTRICLE: The right ventricle measures 4.3 cm in the four-chamber   plane. No free wall thickening of the right ventricle. No convincing   focus of late gadolinium enhancement.    LEFT ATRIUM: The left atrium measures 3.6 cm in the 3 chamber plane.   There are bilateral superior and inferior pulmonary veins..    LEFT VENTRICLE: The left ventricle measures 5.4 cm   (anteroseptal/inferolateral). No asymmetric or segmental thickening of   the left ventricular myocardium. There is no convincing focus of   increased T2 signal.There is no resting first pass perfusion abnormality.     There is subtle late gadolinium enhancement along the inferior hinge   point of the right ventricle. The linear increased signal along the   anteroseptal wall at the base is secondary to artifact from trabeculated   myocardium and slice thickness.      FUNCTION: Left ventricle is hypokinetic. There is hypokinesis of the   anteroseptal and inferoseptal walls at the base and mid cavity. In   addition, there is hypokinesis of the septum and inferior wall at the   apical level. There are subtle paradoxical septal motion.    LEFT VENTRICLE:  Unindexed:  EF: 43.94 %  EDV: 197.35 mL  ESV: 110.63 mL  SV: 86.72 mL  CO: 4.63 L/min    Indexed:  EDVi: 92.97 mL/m2  ESVi: 52.12 mL/m2  SV: 40.86 mL/m2  CO: 2.18 L/min/m2      VALVES:  TRICUSPID VALVE: Tricuspid regurgitation.  MITRAL VALVE: Mitral regurgitation. No systolic anterior motion of the   mitral valve. The anterior mitral valveleaflet and posterior mitral   valve leaflet on qualitatively thickened.  AORTIC VALVE: Aortic regurgitation.      AORTA: Three-vessel left-sided aortic arch and left-sided descending   thoracic aorta.      NONCARDIAC FINDINGS: Hepatic lesions have increased signal on the T1 and   T2-weighted images and though incomplete characterized likely represent   cysts.      IMPRESSION:    1.  The calculated left ventricular ejection fraction is 43.94%.  2.  Subtle foci of late gadolinium enhancement along the inferior hinge   point of the right ventricle could be secondary to altered ventricular   mechanics or alternatively elevated pulmonary      < from: CT Chest No Cont (10.08.18 @ 08:47) >      INTERPRETATION:  CLINICAL INFORMATION: History of protracted hiccups.   Rule out mass.    COMPARISON: None.    PROCEDURE:   CT of the Chest was performed without intravenous contrast.  Sagittal and coronal reformats were performed.  Maximum intensity projection images were generated.     FINDINGS:    The quality of the images are degraded by respiratory motion.    LUNGS AND LARGE AIRWAYS: Patent central airways. No bronchial wall   thickening or bronchiectasis.     No lung mass or consolidation. Punctate calcified granuloma in the right   upper lobe.    PLEURA: Trace right pleural effusion. No pneumothorax.    VESSELS: Atherosclerotic changes. Thoracic aorta and pulmonary artery are   normal in course and caliber. Mild aortic valve leaflet calcifications.    HEART: Heart size is normal. No pericardial effusion. Coronary   calcifications.    MEDIASTINUM AND SERGEY: No lymphadenopathy. Calcified mediastinal lymph   nodes.    CHEST WALL AND LOWER NECK: 1.6 cm nodule within the left thyroid gland.    VISUALIZED UPPER ABDOMEN: Hepatic cysts. Atherosclerosis.    BONES: Degenerative osseous changes.     IMPRESSION:     Trace right pleural effusion. No lung massor consolidation. No   suspicious pulmonary nodule.    Remote granulomatous disease.    Coronary and aortic atherosclerosis.    1.6 cm left thyroid nodule. Recommend evaluation with dedicated   ultrasound.                      < end of copied text >      PROTEIN CALORIE MALNUTRITION PRESENT: [ ] Yes [ ] No  [ ] PPSV2 < or = to 30% [ ] significant weight loss  [ ] poor nutritional intake [ ] catabolic state [ ] anasarca     Albumin, Serum: 3.8 g/dL (11-13-19 @ 04:22)  Artificial Nutrition [ ]     REFERRALS:   [ ]Chaplaincy  [ ] Hospice  [ ]Child Life  [x ]Social Work  [ ]Case management [ ]Holistic Therapy     Goals of Care Document:

## 2019-11-14 NOTE — PROGRESS NOTE ADULT - PROBLEM SELECTOR PLAN 5
kps 10%  Plan to go to PCU when bed available.  Dilaudid gtt at 4 mgs/hr.  Pt appears comfortable with wife and granddaughter at bedside.

## 2019-11-14 NOTE — DISCHARGE NOTE FOR THE EXPIRED PATIENT - HOSPITAL COURSE
Patient is an 83 year old male brought in by EMS to [place] ED with his wife after he was found down on the floor of his house with seizure like activity.  According to the wife, he was not his usual self for the last 1 week complaining of fatigue and "forget-fullness." As per the wife he has a history of A-fib, he was not on any anticoagulation or antiplatelets.  He presented to [place] ED where a code stroke was called because he was found to have acute right sided weakness, CT head at that time showed no hemorrhage.  Patient's symptoms began 3 hours before arrival to ED as per wife so the decision to give TPA was made.  TPA was given at 21:25 on . Subsequently in [place] ED, the patient then underwent a CT angiogram of head at 00:27 on , which shows multiple intraparenchymal hemorrhages and slight IVH as per report.  The decision was made to intubate the patient at that time.  Difficult intubation 2/2 swollen tongue, anesthesia assisted with optic scope in OR. [place] ED called [facility] for transfer 2/2 hemorrhagic conversion post tpa, discussed with ED attending to give 10 units of Cryoprecipitate, give plts if plt count <100,000 and control SBP< 140.  Patient was transferred to [facility]. Upon arrival, patient had multiple runs of V-tach and was in rapid a-fib.  Loaded with Amiodarone IV and started on gtt. Family discussion was done and palliative care was consulted. Patient was made DNR  and patient was made full comfort care and palliative measures per family wishes. Patient  on 19, time of death 18:43. Patient is an 83 year old male brought in by EMS to [place] ED with his wife after he was found down on the floor of his house with seizure like activity.  According to the wife, he was not his usual self for the last 1 week complaining of fatigue and "forget-fullness." As per the wife he has a history of A-fib, he was not on any anticoagulation or antiplatelets.  He presented to [place] ED where a code stroke was called because he was found to have acute right sided weakness, CT head at that time showed no hemorrhage.  Patient's symptoms began 3 hours before arrival to ED as per wife so the decision to give TPA was made.  TPA was given at 21:25 on . Subsequently in [place] ED, the patient then underwent a CT angiogram of head at 00:27 on , which shows multiple intraparenchymal hemorrhages and slight IVH as per report.  The decision was made to intubate the patient at that time.  Difficult intubation 2/2 swollen tongue, anesthesia assisted with optic scope in OR. [place] ED called [facility] for transfer 2/2 hemorrhagic conversion post tpa, discussed with ED attending to give 10 units of Cryoprecipitate, give plts if plt count <100,000 and control SBP< 140.  Patient was transferred to [facility]. Upon arrival, patient had multiple runs of V-tach and was in rapid a-fib.  Loaded with Amiodarone IV and started on drip. Family discussion was done and palliative care was consulted. Patient was made DNR  and patient was made full comfort care and palliative measures per family wishes. Patient  on 19, time of death 18:43.

## 2019-11-14 NOTE — PROGRESS NOTE ADULT - REASON FOR ADMISSION
CVA, hemorrhagic conversion post tpa

## 2019-11-14 NOTE — PROGRESS NOTE ADULT - ASSESSMENT
82 yo M with a-fib (not on anticoagulation per wife) presenting with right sided weakness s/p TPA  with hemorrhagic conversion, acute resp failure, NSVT.  palliative care called for goals of care.

## 2019-11-14 NOTE — PROGRESS NOTE ADULT - SUBJECTIVE AND OBJECTIVE BOX
Extubated at family's request. Secretions requiring Robinol. Increased analgesia due to discomfort.     Currently, appears comfortable without laboured respirations.

## 2019-11-15 LAB — FIBRINOGEN AG PPP IA-MCNC: 430 MG/DL — HIGH (ref 180–350)

## 2019-11-15 PROCEDURE — 84295 ASSAY OF SERUM SODIUM: CPT

## 2019-11-15 PROCEDURE — 80076 HEPATIC FUNCTION PANEL: CPT

## 2019-11-15 PROCEDURE — 86901 BLOOD TYPING SEROLOGIC RH(D): CPT

## 2019-11-15 PROCEDURE — 71045 X-RAY EXAM CHEST 1 VIEW: CPT

## 2019-11-15 PROCEDURE — 83036 HEMOGLOBIN GLYCOSYLATED A1C: CPT

## 2019-11-15 PROCEDURE — 84443 ASSAY THYROID STIM HORMONE: CPT

## 2019-11-15 PROCEDURE — 85385 FIBRINOGEN ANTIGEN: CPT

## 2019-11-15 PROCEDURE — 85610 PROTHROMBIN TIME: CPT

## 2019-11-15 PROCEDURE — 85362 FIBRIN DEGRADATION PRODUCTS: CPT

## 2019-11-15 PROCEDURE — 80061 LIPID PANEL: CPT

## 2019-11-15 PROCEDURE — 83605 ASSAY OF LACTIC ACID: CPT

## 2019-11-15 PROCEDURE — 86900 BLOOD TYPING SEROLOGIC ABO: CPT

## 2019-11-15 PROCEDURE — 84436 ASSAY OF TOTAL THYROXINE: CPT

## 2019-11-15 PROCEDURE — 84480 ASSAY TRIIODOTHYRONINE (T3): CPT

## 2019-11-15 PROCEDURE — 86850 RBC ANTIBODY SCREEN: CPT

## 2019-11-15 PROCEDURE — 82330 ASSAY OF CALCIUM: CPT

## 2019-11-15 PROCEDURE — 85014 HEMATOCRIT: CPT

## 2019-11-15 PROCEDURE — 82803 BLOOD GASES ANY COMBINATION: CPT

## 2019-11-15 PROCEDURE — 85730 THROMBOPLASTIN TIME PARTIAL: CPT

## 2019-11-15 PROCEDURE — 80048 BASIC METABOLIC PNL TOTAL CA: CPT

## 2019-11-15 PROCEDURE — 85027 COMPLETE CBC AUTOMATED: CPT

## 2019-11-15 PROCEDURE — 82435 ASSAY OF BLOOD CHLORIDE: CPT

## 2019-11-15 PROCEDURE — 84100 ASSAY OF PHOSPHORUS: CPT

## 2019-11-15 PROCEDURE — 84132 ASSAY OF SERUM POTASSIUM: CPT

## 2019-11-15 PROCEDURE — 84484 ASSAY OF TROPONIN QUANT: CPT

## 2019-11-15 PROCEDURE — 82947 ASSAY GLUCOSE BLOOD QUANT: CPT

## 2019-11-15 PROCEDURE — 94002 VENT MGMT INPAT INIT DAY: CPT

## 2019-11-15 PROCEDURE — 83735 ASSAY OF MAGNESIUM: CPT

## 2019-11-15 NOTE — DISCUSSION/SUMMARY
[FreeTextEntry1] : Patient Death Notification - ISHAN AMAYA was reported as  on 2019 18:43 during hospitalization at [facility]

## 2019-11-18 ENCOUNTER — APPOINTMENT (OUTPATIENT)
Dept: INTERNAL MEDICINE | Facility: CLINIC | Age: 84
End: 2019-11-18

## 2019-11-25 ENCOUNTER — MEDICATION RENEWAL (OUTPATIENT)
Age: 84
End: 2019-11-25

## 2019-11-25 RX ORDER — POTASSIUM CHLORIDE 750 MG/1
10 TABLET, FILM COATED, EXTENDED RELEASE ORAL
Qty: 360 | Refills: 3 | Status: ACTIVE | COMMUNITY
Start: 2017-04-12 | End: 1900-01-01

## 2020-11-03 NOTE — ED ADULT TRIAGE NOTE - ESI TRIAGE ACUITY LEVEL, MLM
Immunization(s) given during visit:    Immunizations Administered     Name Date Dose Route    Influenza, Quadv, adjuvanted, 65 yrs +, IM, PF (Fluad) 11/3/2020 0.5 mL Intramuscular    Site: Deltoid- Left    Lot: [id number]    NDC: 39225-655-43    Pneumococcal Polysaccharide ([id number]) 11/3/2020 0.5 mL Intramuscular    Site: Deltoid- Right    Lot: [id number]    NDC: 1911-7338-58 3

## 2022-12-01 NOTE — ED ADULT TRIAGE NOTE - MEANS OF ARRIVAL
Arrived in [facility] [place] bed 22  drowsy and responds to name. Abdomen rounded and soft and states non tender. Spouse with her.   1242 taking soda. No nausea or pain. Soft abdomen. [phone] [phone] Discharge instructions reviewed. Patient and spouse in room  educated, using the teach back method, about follow up instructions and discharge instructions. A completed copy of the AVS instructions given to patient and all questions answered. 1305 Sitting on edge of bed and dressing self. Waiting on call from MD  [phone] Phone call from Dr Akira Arndt to spouse and patient. Patient up to void. Steady when up.   1342 Wheeled to car. Steady walking. ambulatory

## 2024-01-23 NOTE — ED ADULT NURSE NOTE - NS ED NURSE RECORD ANOTHER HT AND WT
Detail Level: Generalized
Sunscreen Recommendations: Recommended fragrance free or Mineral Based Sunscreens
Detail Level: Zone
Detail Level: Simple
Detail Level: Detailed
Yes

## 2024-06-07 NOTE — ED ADULT NURSE NOTE - NS ED NURSE DC INFO COMPLEXITY
RX for CPAP and clinicals sent to [facility] via [facility]  
Simple: Patient demonstrates quick and easy understanding/Verbalized Understanding